# Patient Record
Sex: MALE | Race: ASIAN | NOT HISPANIC OR LATINO | URBAN - METROPOLITAN AREA
[De-identification: names, ages, dates, MRNs, and addresses within clinical notes are randomized per-mention and may not be internally consistent; named-entity substitution may affect disease eponyms.]

---

## 2015-01-06 RX ORDER — PANTOPRAZOLE SODIUM 20 MG/1
1 TABLET, DELAYED RELEASE ORAL
Qty: 0 | Refills: 0 | DISCHARGE
Start: 2015-01-06

## 2015-01-06 RX ORDER — ATORVASTATIN CALCIUM 80 MG/1
1 TABLET, FILM COATED ORAL
Qty: 0 | Refills: 0 | DISCHARGE
Start: 2015-01-06

## 2017-05-09 ENCOUNTER — INPATIENT (INPATIENT)
Facility: HOSPITAL | Age: 72
LOS: 1 days | Discharge: HOME | End: 2017-05-11
Attending: INTERNAL MEDICINE | Admitting: INTERNAL MEDICINE
Payer: MEDICARE

## 2017-05-09 VITALS
SYSTOLIC BLOOD PRESSURE: 146 MMHG | OXYGEN SATURATION: 100 % | HEART RATE: 79 BPM | DIASTOLIC BLOOD PRESSURE: 69 MMHG | RESPIRATION RATE: 16 BRPM | TEMPERATURE: 98 F

## 2017-05-09 DIAGNOSIS — Z29.9 ENCOUNTER FOR PROPHYLACTIC MEASURES, UNSPECIFIED: ICD-10-CM

## 2017-05-09 DIAGNOSIS — R42 DIZZINESS AND GIDDINESS: ICD-10-CM

## 2017-05-09 DIAGNOSIS — I10 ESSENTIAL (PRIMARY) HYPERTENSION: ICD-10-CM

## 2017-05-09 DIAGNOSIS — E78.5 HYPERLIPIDEMIA, UNSPECIFIED: ICD-10-CM

## 2017-05-09 DIAGNOSIS — E11.59 TYPE 2 DIABETES MELLITUS WITH OTHER CIRCULATORY COMPLICATIONS: ICD-10-CM

## 2017-05-09 DIAGNOSIS — I60.9 NONTRAUMATIC SUBARACHNOID HEMORRHAGE, UNSPECIFIED: ICD-10-CM

## 2017-05-09 DIAGNOSIS — N40.0 BENIGN PROSTATIC HYPERPLASIA WITHOUT LOWER URINARY TRACT SYMPTOMS: ICD-10-CM

## 2017-05-09 LAB
ALBUMIN SERPL ELPH-MCNC: 4.2 G/DL — SIGNIFICANT CHANGE UP (ref 3.3–5)
ALP SERPL-CCNC: 69 U/L — SIGNIFICANT CHANGE UP (ref 40–120)
ALT FLD-CCNC: 34 U/L — SIGNIFICANT CHANGE UP (ref 4–41)
APPEARANCE UR: CLEAR — SIGNIFICANT CHANGE UP
APTT BLD: 25.8 SEC — LOW (ref 27.5–37.4)
AST SERPL-CCNC: 34 U/L — SIGNIFICANT CHANGE UP (ref 4–40)
BASE EXCESS BLDV CALC-SCNC: 1.8 MMOL/L — SIGNIFICANT CHANGE UP
BASOPHILS # BLD AUTO: 0.02 K/UL — SIGNIFICANT CHANGE UP (ref 0–0.2)
BASOPHILS NFR BLD AUTO: 0.3 % — SIGNIFICANT CHANGE UP (ref 0–2)
BILIRUB SERPL-MCNC: 0.3 MG/DL — SIGNIFICANT CHANGE UP (ref 0.2–1.2)
BILIRUB UR-MCNC: NEGATIVE — SIGNIFICANT CHANGE UP
BLOOD GAS VENOUS - CREATININE: 0.92 MG/DL — SIGNIFICANT CHANGE UP (ref 0.5–1.3)
BLOOD UR QL VISUAL: NEGATIVE — SIGNIFICANT CHANGE UP
BUN SERPL-MCNC: 18 MG/DL — SIGNIFICANT CHANGE UP (ref 7–23)
CALCIUM SERPL-MCNC: 9.1 MG/DL — SIGNIFICANT CHANGE UP (ref 8.4–10.5)
CHLORIDE BLDV-SCNC: 102 MMOL/L — SIGNIFICANT CHANGE UP (ref 96–108)
CHLORIDE SERPL-SCNC: 99 MMOL/L — SIGNIFICANT CHANGE UP (ref 98–107)
CK MB BLD-MCNC: 9.8 NG/ML — HIGH (ref 1–6.6)
CK SERPL-CCNC: 504 U/L — HIGH (ref 30–200)
CO2 SERPL-SCNC: 22 MMOL/L — SIGNIFICANT CHANGE UP (ref 22–31)
COLOR SPEC: SIGNIFICANT CHANGE UP
CREAT SERPL-MCNC: 1.02 MG/DL — SIGNIFICANT CHANGE UP (ref 0.5–1.3)
EOSINOPHIL # BLD AUTO: 0.11 K/UL — SIGNIFICANT CHANGE UP (ref 0–0.5)
EOSINOPHIL NFR BLD AUTO: 1.6 % — SIGNIFICANT CHANGE UP (ref 0–6)
GAS PNL BLDV: 136 MMOL/L — SIGNIFICANT CHANGE UP (ref 136–146)
GLUCOSE BLDV-MCNC: 215 — HIGH (ref 70–99)
GLUCOSE SERPL-MCNC: 211 MG/DL — HIGH (ref 70–99)
GLUCOSE UR-MCNC: NEGATIVE — SIGNIFICANT CHANGE UP
HCO3 BLDV-SCNC: 24 MMOL/L — SIGNIFICANT CHANGE UP (ref 20–27)
HCT VFR BLD CALC: 39.1 % — SIGNIFICANT CHANGE UP (ref 39–50)
HCT VFR BLDV CALC: 40.6 % — SIGNIFICANT CHANGE UP (ref 39–51)
HGB BLD-MCNC: 12.9 G/DL — LOW (ref 13–17)
HGB BLDV-MCNC: 13.2 G/DL — SIGNIFICANT CHANGE UP (ref 13–17)
IMM GRANULOCYTES NFR BLD AUTO: 0.3 % — SIGNIFICANT CHANGE UP (ref 0–1.5)
INR BLD: 1.1 — SIGNIFICANT CHANGE UP (ref 0.88–1.17)
KETONES UR-MCNC: NEGATIVE — SIGNIFICANT CHANGE UP
LACTATE BLDV-MCNC: 2.8 MMOL/L — HIGH (ref 0.5–2)
LEUKOCYTE ESTERASE UR-ACNC: NEGATIVE — SIGNIFICANT CHANGE UP
LYMPHOCYTES # BLD AUTO: 2.2 K/UL — SIGNIFICANT CHANGE UP (ref 1–3.3)
LYMPHOCYTES # BLD AUTO: 31.4 % — SIGNIFICANT CHANGE UP (ref 13–44)
MCHC RBC-ENTMCNC: 32.3 PG — SIGNIFICANT CHANGE UP (ref 27–34)
MCHC RBC-ENTMCNC: 33 % — SIGNIFICANT CHANGE UP (ref 32–36)
MCV RBC AUTO: 97.8 FL — SIGNIFICANT CHANGE UP (ref 80–100)
MONOCYTES # BLD AUTO: 0.51 K/UL — SIGNIFICANT CHANGE UP (ref 0–0.9)
MONOCYTES NFR BLD AUTO: 7.3 % — SIGNIFICANT CHANGE UP (ref 2–14)
NEUTROPHILS # BLD AUTO: 4.14 K/UL — SIGNIFICANT CHANGE UP (ref 1.8–7.4)
NEUTROPHILS NFR BLD AUTO: 59.1 % — SIGNIFICANT CHANGE UP (ref 43–77)
NITRITE UR-MCNC: NEGATIVE — SIGNIFICANT CHANGE UP
PCO2 BLDV: 54 MMHG — HIGH (ref 41–51)
PH BLDV: 7.32 PH — SIGNIFICANT CHANGE UP (ref 7.32–7.43)
PH UR: 6.5 — SIGNIFICANT CHANGE UP (ref 4.6–8)
PLATELET # BLD AUTO: 177 K/UL — SIGNIFICANT CHANGE UP (ref 150–400)
PMV BLD: 11.4 FL — SIGNIFICANT CHANGE UP (ref 7–13)
PO2 BLDV: < 24 MMHG — LOW (ref 35–40)
POTASSIUM BLDV-SCNC: 4.9 MMOL/L — HIGH (ref 3.4–4.5)
POTASSIUM SERPL-MCNC: 5 MMOL/L — SIGNIFICANT CHANGE UP (ref 3.5–5.3)
POTASSIUM SERPL-SCNC: 5 MMOL/L — SIGNIFICANT CHANGE UP (ref 3.5–5.3)
PROT SERPL-MCNC: 7.5 G/DL — SIGNIFICANT CHANGE UP (ref 6–8.3)
PROT UR-MCNC: NEGATIVE — SIGNIFICANT CHANGE UP
PROTHROM AB SERPL-ACNC: 12.3 SEC — SIGNIFICANT CHANGE UP (ref 9.8–13.1)
RBC # BLD: 4 M/UL — LOW (ref 4.2–5.8)
RBC # FLD: 13.1 % — SIGNIFICANT CHANGE UP (ref 10.3–14.5)
RBC CASTS # UR COMP ASSIST: SIGNIFICANT CHANGE UP (ref 0–?)
SAO2 % BLDV: 32.9 % — LOW (ref 60–85)
SODIUM SERPL-SCNC: 136 MMOL/L — SIGNIFICANT CHANGE UP (ref 135–145)
SP GR SPEC: 1.03 — SIGNIFICANT CHANGE UP (ref 1–1.03)
TROPONIN T SERPL-MCNC: < 0.06 NG/ML — SIGNIFICANT CHANGE UP (ref 0–0.06)
UROBILINOGEN FLD QL: NORMAL E.U. — SIGNIFICANT CHANGE UP (ref 0.1–0.2)
WBC # BLD: 7 K/UL — SIGNIFICANT CHANGE UP (ref 3.8–10.5)
WBC # FLD AUTO: 7 K/UL — SIGNIFICANT CHANGE UP (ref 3.8–10.5)
WBC UR QL: SIGNIFICANT CHANGE UP (ref 0–?)

## 2017-05-09 PROCEDURE — 70498 CT ANGIOGRAPHY NECK: CPT | Mod: 26,52

## 2017-05-09 PROCEDURE — 70496 CT ANGIOGRAPHY HEAD: CPT | Mod: 26

## 2017-05-09 PROCEDURE — 99223 1ST HOSP IP/OBS HIGH 75: CPT

## 2017-05-09 PROCEDURE — 70551 MRI BRAIN STEM W/O DYE: CPT | Mod: 26

## 2017-05-09 RX ORDER — TAMSULOSIN HYDROCHLORIDE 0.4 MG/1
0.4 CAPSULE ORAL AT BEDTIME
Qty: 0 | Refills: 0 | Status: DISCONTINUED | OUTPATIENT
Start: 2017-05-09 | End: 2017-05-11

## 2017-05-09 RX ORDER — GLUCAGON INJECTION, SOLUTION 0.5 MG/.1ML
1 INJECTION, SOLUTION SUBCUTANEOUS ONCE
Qty: 0 | Refills: 0 | Status: DISCONTINUED | OUTPATIENT
Start: 2017-05-09 | End: 2017-05-11

## 2017-05-09 RX ORDER — ATORVASTATIN CALCIUM 80 MG/1
20 TABLET, FILM COATED ORAL AT BEDTIME
Qty: 0 | Refills: 0 | Status: DISCONTINUED | OUTPATIENT
Start: 2017-05-09 | End: 2017-05-11

## 2017-05-09 RX ORDER — DEXTROSE 50 % IN WATER 50 %
1 SYRINGE (ML) INTRAVENOUS ONCE
Qty: 0 | Refills: 0 | Status: DISCONTINUED | OUTPATIENT
Start: 2017-05-09 | End: 2017-05-11

## 2017-05-09 RX ORDER — ASPIRIN/CALCIUM CARB/MAGNESIUM 324 MG
81 TABLET ORAL ONCE
Qty: 0 | Refills: 0 | Status: COMPLETED | OUTPATIENT
Start: 2017-05-09 | End: 2017-05-09

## 2017-05-09 RX ORDER — LISINOPRIL 2.5 MG/1
2.5 TABLET ORAL DAILY
Qty: 0 | Refills: 0 | Status: DISCONTINUED | OUTPATIENT
Start: 2017-05-09 | End: 2017-05-11

## 2017-05-09 RX ORDER — PANTOPRAZOLE SODIUM 20 MG/1
40 TABLET, DELAYED RELEASE ORAL
Qty: 0 | Refills: 0 | Status: DISCONTINUED | OUTPATIENT
Start: 2017-05-09 | End: 2017-05-11

## 2017-05-09 RX ORDER — ASPIRIN/CALCIUM CARB/MAGNESIUM 324 MG
81 TABLET ORAL DAILY
Qty: 0 | Refills: 0 | Status: DISCONTINUED | OUTPATIENT
Start: 2017-05-09 | End: 2017-05-11

## 2017-05-09 RX ORDER — DEXTROSE 50 % IN WATER 50 %
25 SYRINGE (ML) INTRAVENOUS ONCE
Qty: 0 | Refills: 0 | Status: DISCONTINUED | OUTPATIENT
Start: 2017-05-09 | End: 2017-05-11

## 2017-05-09 RX ORDER — SODIUM CHLORIDE 9 MG/ML
1000 INJECTION, SOLUTION INTRAVENOUS
Qty: 0 | Refills: 0 | Status: DISCONTINUED | OUTPATIENT
Start: 2017-05-09 | End: 2017-05-11

## 2017-05-09 RX ORDER — METOCLOPRAMIDE HCL 10 MG
10 TABLET ORAL ONCE
Qty: 0 | Refills: 0 | Status: COMPLETED | OUTPATIENT
Start: 2017-05-09 | End: 2017-05-09

## 2017-05-09 RX ORDER — ENOXAPARIN SODIUM 100 MG/ML
40 INJECTION SUBCUTANEOUS EVERY 24 HOURS
Qty: 0 | Refills: 0 | Status: DISCONTINUED | OUTPATIENT
Start: 2017-05-09 | End: 2017-05-11

## 2017-05-09 RX ORDER — INSULIN LISPRO 100/ML
VIAL (ML) SUBCUTANEOUS AT BEDTIME
Qty: 0 | Refills: 0 | Status: DISCONTINUED | OUTPATIENT
Start: 2017-05-09 | End: 2017-05-11

## 2017-05-09 RX ORDER — INSULIN LISPRO 100/ML
VIAL (ML) SUBCUTANEOUS
Qty: 0 | Refills: 0 | Status: DISCONTINUED | OUTPATIENT
Start: 2017-05-09 | End: 2017-05-11

## 2017-05-09 RX ORDER — DEXTROSE 50 % IN WATER 50 %
12.5 SYRINGE (ML) INTRAVENOUS ONCE
Qty: 0 | Refills: 0 | Status: DISCONTINUED | OUTPATIENT
Start: 2017-05-09 | End: 2017-05-11

## 2017-05-09 RX ADMIN — LISINOPRIL 2.5 MILLIGRAM(S): 2.5 TABLET ORAL at 17:14

## 2017-05-09 RX ADMIN — Medication 10 MILLIGRAM(S): at 10:04

## 2017-05-09 RX ADMIN — Medication 81 MILLIGRAM(S): at 10:08

## 2017-05-09 RX ADMIN — TAMSULOSIN HYDROCHLORIDE 0.4 MILLIGRAM(S): 0.4 CAPSULE ORAL at 22:15

## 2017-05-09 RX ADMIN — ENOXAPARIN SODIUM 40 MILLIGRAM(S): 100 INJECTION SUBCUTANEOUS at 17:14

## 2017-05-09 RX ADMIN — ATORVASTATIN CALCIUM 20 MILLIGRAM(S): 80 TABLET, FILM COATED ORAL at 22:15

## 2017-05-09 NOTE — ED ADULT NURSE NOTE - CHIEF COMPLAINT QUOTE
when pt awoke this morning he felt dizzy and nauseous  vomited x 1   states that sometimes he gets pain in his chest but has had none today  ekg at triage finger stick 176 at triage

## 2017-05-09 NOTE — ED PROVIDER NOTE - PHYSICAL EXAMINATION
ATTENDING PHYSICAL EXAM DR. RADER ***GEN - NAD; well appearing; A+O x3 ***HEAD - NC/AT ***EYES/NOSE - PERRL, EOMI, mucous membranes moist, no discharge ***THROAT: Oral cavity and pharynx normal. No inflammation, swelling, exudate, or lesions.  ***NECK: Neck supple, non-tender without lymphadenopathy, no masses, no thyromegaly.   ***PULMONARY - CTA b/l, symmetric breath sounds. ***CARDIAC -s1s2, RRR, no M,G,R  ***ABDOMEN - +BS, ND, NT, soft, no guarding, no rebound, no masses   ***BACK - no CVA tenderness, Normal  spine ***EXTREMITIES - symmetric pulses, 2+ dp, capillary refill < 2 seconds, no clubbing, no cyanosis, no edema ***SKIN - no rash or bruising   ***NEUROLOGIC - alert, CN 2-12 intact, reflexes nl, sensation nl, coordination nl, finger to nose abnormal on b/l ext, romberg negative, motor 5/5 RUE/LUE/RLE/LLE/EHL/Plantar flexion, no pronator drift, wide base gait, no nystagmus

## 2017-05-09 NOTE — H&P ADULT. - HISTORY OF PRESENT ILLNESS
72M HTN, HLD, DM2, CAD, BPH, prior SAH who presents with dizziness since early this morning. Pt noticed this on waking and was unable to ambulate. He denies chest pain, SOB, palpitations, headache, or vision changes. However, he did have nausea with non-bilious vomiting. He has had several similar episodes in the past that did not culminate in vomiting. 72M HTN, HLD, DM2, CAD, BPH, prior SAH who presents with dizziness since early this morning. Pt noticed this on waking and was unable to ambulate. He denies chest pain, SOB, palpitations, headache, or vision changes. However, he did have nausea with non-bilious, non-bloody vomiting. He has had several similar episodes in the past that did not culminate in vomiting. He states that the dizziness persists even now while he is lying in bed.

## 2017-05-09 NOTE — ED ADULT NURSE NOTE - OBJECTIVE STATEMENT
Pt. received in stable condition and mild distress with c/o of dizziness and n/v x this AM. TONY Dan put IV 20G in left arm. Labs were drawn and sent. MD evaluation in progress. On CM. VSS noted. Will cont. to monitor.

## 2017-05-09 NOTE — ED PROVIDER NOTE - OBJECTIVE STATEMENT
72M c/o dizziness and vomiting since 6am.  Pt had just waken from bed and upon getting up felt dizzy and had difficulty walking.  Pt felt as if he was going to lose consciousness.  No chest pain today.  Pt c/o dizziness episodically on a regular basis lasting a few minutes, but usually not associated with vomiting and not this severe.  No abdominal pain or diarrhea.  No headache, no blurred vision.  No paresthesia or anesthesia.  No weakness in arms.  No hearing loss, no tinnitus. 72M c/o dizziness and vomiting since 6am.  Pt had just waken from bed at 6am and upon getting up felt dizzy and had difficulty walking.  Pt felt as if he was going to lose consciousness.  No chest pain today.  Pt c/o dizziness episodically on a regular basis lasting a few minutes, but usually not associated with vomiting and not this severe.  No abdominal pain or diarrhea.  No headache, no blurred vision.  No paresthesia or anesthesia.  No weakness in arms.  No hearing loss, no tinnitus.  Last known normal last night 330am.

## 2017-05-09 NOTE — ED ADULT TRIAGE NOTE - CHIEF COMPLAINT QUOTE
when pt awoke this morning he felt dizzy and nauseous  vomited x 1   states that sometimes he gets pain in his chest but has had none today  ekg at triage when pt awoke this morning he felt dizzy and nauseous  vomited x 1   states that sometimes he gets pain in his chest but has had none today  ekg at triage finger stick 176 at triage

## 2017-05-09 NOTE — H&P ADULT. - FAMILY HISTORY
Father  Still living? Unknown  Cerebrovascular accident (CVA), Age at diagnosis: Age Unknown     Mother  Still living? Unknown  Family history of liver disease, Age at diagnosis: Age Unknown

## 2017-05-09 NOTE — PATIENT PROFILE ADULT. - PMH
BPH (benign prostatic hypertrophy)    CAD (Coronary Artery Disease)    Diabetes    Dyslipidemia    Gastritis    HTN (Hypertension), Benign    SAH (subarachnoid hemorrhage)

## 2017-05-09 NOTE — ED PROVIDER NOTE - PMH
Arthritis    BPH (benign prostatic hypertrophy)    CAD (Coronary Artery Disease)    Diabetes    Dyslipidemia    Gastritis    Hemorrhoids, unspecified hemorrhoid type    HTN (Hypertension), Benign    SAH (subarachnoid hemorrhage)

## 2017-05-10 LAB
BASOPHILS # BLD AUTO: 0.01 K/UL — SIGNIFICANT CHANGE UP (ref 0–0.2)
BASOPHILS NFR BLD AUTO: 0.2 % — SIGNIFICANT CHANGE UP (ref 0–2)
BUN SERPL-MCNC: 20 MG/DL — SIGNIFICANT CHANGE UP (ref 7–23)
CALCIUM SERPL-MCNC: 9.1 MG/DL — SIGNIFICANT CHANGE UP (ref 8.4–10.5)
CHLORIDE SERPL-SCNC: 100 MMOL/L — SIGNIFICANT CHANGE UP (ref 98–107)
CO2 SERPL-SCNC: 25 MMOL/L — SIGNIFICANT CHANGE UP (ref 22–31)
CREAT SERPL-MCNC: 1.05 MG/DL — SIGNIFICANT CHANGE UP (ref 0.5–1.3)
EOSINOPHIL # BLD AUTO: 0.17 K/UL — SIGNIFICANT CHANGE UP (ref 0–0.5)
EOSINOPHIL NFR BLD AUTO: 2.6 % — SIGNIFICANT CHANGE UP (ref 0–6)
GLUCOSE SERPL-MCNC: 144 MG/DL — HIGH (ref 70–99)
HBA1C BLD-MCNC: 8.3 % — HIGH (ref 4–5.6)
HCT VFR BLD CALC: 37.2 % — LOW (ref 39–50)
HGB BLD-MCNC: 12.5 G/DL — LOW (ref 13–17)
IMM GRANULOCYTES NFR BLD AUTO: 0.2 % — SIGNIFICANT CHANGE UP (ref 0–1.5)
LYMPHOCYTES # BLD AUTO: 3.07 K/UL — SIGNIFICANT CHANGE UP (ref 1–3.3)
LYMPHOCYTES # BLD AUTO: 46.7 % — HIGH (ref 13–44)
MCHC RBC-ENTMCNC: 32.6 PG — SIGNIFICANT CHANGE UP (ref 27–34)
MCHC RBC-ENTMCNC: 33.6 % — SIGNIFICANT CHANGE UP (ref 32–36)
MCV RBC AUTO: 96.9 FL — SIGNIFICANT CHANGE UP (ref 80–100)
MONOCYTES # BLD AUTO: 0.51 K/UL — SIGNIFICANT CHANGE UP (ref 0–0.9)
MONOCYTES NFR BLD AUTO: 7.8 % — SIGNIFICANT CHANGE UP (ref 2–14)
NEUTROPHILS # BLD AUTO: 2.8 K/UL — SIGNIFICANT CHANGE UP (ref 1.8–7.4)
NEUTROPHILS NFR BLD AUTO: 42.5 % — LOW (ref 43–77)
PLATELET # BLD AUTO: 169 K/UL — SIGNIFICANT CHANGE UP (ref 150–400)
PMV BLD: 11.7 FL — SIGNIFICANT CHANGE UP (ref 7–13)
POTASSIUM SERPL-MCNC: 4.5 MMOL/L — SIGNIFICANT CHANGE UP (ref 3.5–5.3)
POTASSIUM SERPL-SCNC: 4.5 MMOL/L — SIGNIFICANT CHANGE UP (ref 3.5–5.3)
RBC # BLD: 3.84 M/UL — LOW (ref 4.2–5.8)
RBC # FLD: 13.2 % — SIGNIFICANT CHANGE UP (ref 10.3–14.5)
SODIUM SERPL-SCNC: 138 MMOL/L — SIGNIFICANT CHANGE UP (ref 135–145)
WBC # BLD: 6.57 K/UL — SIGNIFICANT CHANGE UP (ref 3.8–10.5)
WBC # FLD AUTO: 6.57 K/UL — SIGNIFICANT CHANGE UP (ref 3.8–10.5)

## 2017-05-10 PROCEDURE — 99221 1ST HOSP IP/OBS SF/LOW 40: CPT | Mod: GC

## 2017-05-10 PROCEDURE — 93306 TTE W/DOPPLER COMPLETE: CPT | Mod: 26

## 2017-05-10 RX ADMIN — PANTOPRAZOLE SODIUM 40 MILLIGRAM(S): 20 TABLET, DELAYED RELEASE ORAL at 05:17

## 2017-05-10 RX ADMIN — TAMSULOSIN HYDROCHLORIDE 0.4 MILLIGRAM(S): 0.4 CAPSULE ORAL at 21:57

## 2017-05-10 RX ADMIN — ATORVASTATIN CALCIUM 20 MILLIGRAM(S): 80 TABLET, FILM COATED ORAL at 21:57

## 2017-05-10 RX ADMIN — Medication 2: at 16:53

## 2017-05-10 RX ADMIN — ENOXAPARIN SODIUM 40 MILLIGRAM(S): 100 INJECTION SUBCUTANEOUS at 17:00

## 2017-05-10 RX ADMIN — Medication 2: at 08:34

## 2017-05-10 RX ADMIN — Medication 81 MILLIGRAM(S): at 12:40

## 2017-05-10 RX ADMIN — Medication 4: at 12:37

## 2017-05-10 RX ADMIN — LISINOPRIL 2.5 MILLIGRAM(S): 2.5 TABLET ORAL at 05:17

## 2017-05-11 ENCOUNTER — TRANSCRIPTION ENCOUNTER (OUTPATIENT)
Age: 72
End: 2017-05-11

## 2017-05-11 VITALS
OXYGEN SATURATION: 98 % | DIASTOLIC BLOOD PRESSURE: 64 MMHG | RESPIRATION RATE: 18 BRPM | SYSTOLIC BLOOD PRESSURE: 119 MMHG | HEART RATE: 79 BPM

## 2017-05-11 RX ADMIN — ENOXAPARIN SODIUM 40 MILLIGRAM(S): 100 INJECTION SUBCUTANEOUS at 16:54

## 2017-05-11 RX ADMIN — Medication 2: at 12:38

## 2017-05-11 RX ADMIN — LISINOPRIL 2.5 MILLIGRAM(S): 2.5 TABLET ORAL at 05:35

## 2017-05-11 RX ADMIN — Medication 2: at 16:55

## 2017-05-11 RX ADMIN — Medication 2: at 08:52

## 2017-05-11 RX ADMIN — Medication 81 MILLIGRAM(S): at 12:38

## 2017-05-11 RX ADMIN — PANTOPRAZOLE SODIUM 40 MILLIGRAM(S): 20 TABLET, DELAYED RELEASE ORAL at 05:35

## 2017-05-11 NOTE — DISCHARGE NOTE ADULT - CARE PROVIDER_API CALL
Olu Anthony), Cardiovascular Disease; Internal Medicine; Nuclear Cardiology  [address] [place] [address]  Phone: [phone]  Fax: [phone]

## 2017-05-11 NOTE — DISCHARGE NOTE ADULT - PLAN OF CARE
improved Neurology consulted and followed.   MRI head- -MRI head- Slight progression to atrophy as well as microvascular ischemic change. There is evidence of hemosiderin deposition along the left temporal parietal sulci and in the region of the left central sulcus as well as in the right frontal region. These areas of susceptibility are more conspicuous compared with 4/20/2014 suggesting the possibility   patient may have rehemorrhaged in the interval between the 2 studies. There does not appear to be evidence of acute hemorrhage on the current evaluation as indicated by no change in the FLAIR signal and the CT not demonstrating evidence of hemorrhage at this time  Follow up with neurology clinic outpatient [phone], call for appointment resolved Physical therapy consulted- no skilled needs MRI results as above. Follow up with neurologist outpatient. A1c- 8.3. continue to monitor fingersticks, continue with home diabetes medications. follow diabetic diet. continue with lisinopril continue with flomax continue with aspirin and lipitor

## 2017-05-11 NOTE — DISCHARGE NOTE ADULT - HOSPITAL COURSE
72M HTN, HLD, DM2, CAD, BPH, prior SAH who presents with dizziness since early this morning. Pt noticed this on waking and was unable to ambulate. He denies chest pain, SOB, palpitations, headache, or vision changes. However, he did have nausea with non-bilious, non-bloody vomiting. He has had several similar episodes in the past that did not culminate in vomiting. He states that the dizziness persists even now while he is lying in bed.    Hospital Course:    Dizziness.    -Suspect pre-existing cerebrovascular disease without acute stroke   -Appreciate Neurology consult   -MRI head- Slight progression to atrophy as well as microvascular ischemic change. There is evidence of hemosiderin deposition along the left temporal parietal sulci and in the region of the left central sulcus as well as in the right frontal region. These areas of susceptibility are more conspicuous compared with 4/20/2014 suggesting the possibility   patient may have rehemorrhaged in the interval between the 2 studies. There does not appear to be evidence of acute hemorrhage on the current evaluation as indicated by no change in the FLAIR signal and the CT not demonstrating evidence of hemorrhage at this time  -Continue CV regimen  -orthostatics negative  -ECHO- EF 64% normal LV    HTN   -Continue lisinopril    Type 2 diabetes mellitus with other circulatory complication  -With known CAD s/p stent 2010   -Cover with lispro correctional scale in-house  -A1c- 8.3    Hyperlipidemia  -Continue aspirin and atorvastatin    SAH (subarachnoid hemorrhage).    -Per pt 2014, no residual symptoms.   -No evidence of bleeding on CT today   -Monitor neurologic exam    Benign prostatic hyperplasia without lower urinary tract symptoms   -No current LUTS  -Continue tamsulosin    Need for prophylactic measure.    -IMPROVE score = 2 (age + immobility)  -enoxaparin 40mg subq daily    D/w Dr. Anthony, pt medically stable for discharge. Follow up with PCP in 1 week. Follow up with neurology clinic.

## 2017-05-11 NOTE — DISCHARGE NOTE ADULT - PATIENT PORTAL LINK FT
“You can access the Follow[facility] Patient Portal, offered by [facility], by registering with the following website: [email or web address]”

## 2017-05-11 NOTE — DISCHARGE NOTE ADULT - SECONDARY DIAGNOSIS.
Ataxia SAH (subarachnoid hemorrhage) Type 2 diabetes mellitus with other circulatory complication, without long-term current use of insulin HTN (Hypertension), Benign Benign prostatic hyperplasia without lower urinary tract symptoms, unspecified morphology CAD (coronary artery disease)

## 2017-05-11 NOTE — DISCHARGE NOTE ADULT - MEDICATION SUMMARY - MEDICATIONS TO TAKE
I will START or STAY ON the medications listed below when I get home from the hospital:    aspirin 81 mg oral tablet, chewable  -- 1 tab(s) by mouth once a day  -- Indication: For Blood thinner    lisinopril 2.5 mg oral tablet  -- 1 tab(s) by mouth once a day  -- Indication: For HTN (Hypertension), Benign    tamsulosin 0.4 mg oral capsule  -- 1 cap(s) by mouth once a day  -- Indication: For Benign prostatic hyperplasia without lower urinary tract symptoms, unspecified morphology    glipiZIDE 5 mg oral tablet  -- 1 tab(s) by mouth once a day  -- Indication: For Diabetes    metFORMIN extended release 500 mg oral tablet, extended release  -- 1 tab(s) by mouth once a day  -- Indication: For Diabetes    atorvastatin 20 mg oral tablet  -- 1 tab(s) by mouth once a day (at bedtime)  -- Indication: For Hyperlipidemia, unspecified hyperlipidemia type    Zetia 10 mg oral tablet  -- 1 tab(s) by mouth once a day  -- Indication: For Hyperlipidemia, unspecified hyperlipidemia type    pantoprazole 40 mg oral delayed release tablet  -- 1 tab(s) by mouth once a day (before a meal)  -- Indication: For Stomach protection

## 2017-05-11 NOTE — DISCHARGE NOTE ADULT - CARE PLAN
Principal Discharge DX:	Dizziness  Goal:	improved  Instructions for follow-up, activity and diet:	Neurology consulted and followed.   MRI head- -MRI head- Slight progression to atrophy as well as microvascular ischemic change. There is evidence of hemosiderin deposition along the left temporal parietal sulci and in the region of the left central sulcus as well as in the right frontal region. These areas of susceptibility are more conspicuous compared with 4/20/2014 suggesting the possibility   patient may have rehemorrhaged in the interval between the 2 studies. There does not appear to be evidence of acute hemorrhage on the current evaluation as indicated by no change in the FLAIR signal and the CT not demonstrating evidence of hemorrhage at this time  Follow up with neurology clinic outpatient [phone], call for appointment  Secondary Diagnosis:	Ataxia  Goal:	resolved  Instructions for follow-up, activity and diet:	Physical therapy consulted- no skilled needs  Secondary Diagnosis:	SAH (subarachnoid hemorrhage)  Instructions for follow-up, activity and diet:	MRI results as above. Follow up with neurologist outpatient.  Secondary Diagnosis:	Type 2 diabetes mellitus with other circulatory complication, without long-term current use of insulin  Instructions for follow-up, activity and diet:	A1c- 8.3. continue to monitor fingersticks, continue with home diabetes medications. follow diabetic diet.  Secondary Diagnosis:	HTN (Hypertension), Benign  Instructions for follow-up, activity and diet:	continue with lisinopril  Secondary Diagnosis:	Benign prostatic hyperplasia without lower urinary tract symptoms, unspecified morphology  Instructions for follow-up, activity and diet:	continue with flomax  Secondary Diagnosis:	CAD (coronary artery disease)  Instructions for follow-up, activity and diet:	continue with aspirin and lipitor Principal Discharge DX:	Dizziness  Goal:	improved  Instructions for follow-up, activity and diet:	Neurology consulted and followed.   MRI head- -MRI head- Slight progression to atrophy as well as microvascular ischemic change. There is evidence of hemosiderin deposition along the left temporal parietal sulci and in the region of the left central sulcus as well as in the right frontal region. These areas of susceptibility are more conspicuous compared with 4/20/2014 suggesting the possibility   patient may have rehemorrhaged in the interval between the 2 studies. There does not appear to be evidence of acute hemorrhage on the current evaluation as indicated by no change in the FLAIR signal and the CT not demonstrating evidence of hemorrhage at this time  Follow up with neurology clinic outpatient [phone], call for appointment  Secondary Diagnosis:	Ataxia  Goal:	resolved  Instructions for follow-up, activity and diet:	Physical therapy consulted- no skilled needs  Secondary Diagnosis:	SAH (subarachnoid hemorrhage)  Instructions for follow-up, activity and diet:	MRI results as above. Follow up with neurologist outpatient.  Secondary Diagnosis:	Type 2 diabetes mellitus with other circulatory complication, without long-term current use of insulin  Instructions for follow-up, activity and diet:	A1c- 8.3. continue to monitor fingersticks, continue with home diabetes medications. follow diabetic diet.  Secondary Diagnosis:	HTN (Hypertension), Benign  Instructions for follow-up, activity and diet:	continue with lisinopril  Secondary Diagnosis:	Benign prostatic hyperplasia without lower urinary tract symptoms, unspecified morphology  Instructions for follow-up, activity and diet:	continue with flomax  Secondary Diagnosis:	CAD (coronary artery disease)  Instructions for follow-up, activity and diet:	continue with aspirin and lipitor

## 2017-07-18 ENCOUNTER — OUTPATIENT (OUTPATIENT)
Dept: OUTPATIENT SERVICES | Facility: HOSPITAL | Age: 72
LOS: 1 days | End: 2017-07-18

## 2017-07-18 ENCOUNTER — APPOINTMENT (OUTPATIENT)
Dept: NEUROLOGY | Facility: HOSPITAL | Age: 72
End: 2017-07-18

## 2017-07-18 VITALS
HEART RATE: 74 BPM | SYSTOLIC BLOOD PRESSURE: 133 MMHG | BODY MASS INDEX: 22.71 KG/M2 | HEIGHT: 64 IN | DIASTOLIC BLOOD PRESSURE: 72 MMHG | WEIGHT: 133 LBS

## 2017-07-18 DIAGNOSIS — R42 DIZZINESS AND GIDDINESS: ICD-10-CM

## 2017-07-19 NOTE — DISCHARGE NOTE ADULT - CLICK TO LAUNCH ORM
Body Location Override (Optional - Billing Will Still Be Based On Selected Body Map Location If Applicable): left distal forearm Detail Level: Detailed Add 37634 Cpt? (Important Note: In 2017 The Use Of 87083 Is Being Tracked By Cms To Determine Future Global Period Reimbursement For Global Periods): yes .

## 2018-01-30 ENCOUNTER — APPOINTMENT (OUTPATIENT)
Dept: NEUROLOGY | Facility: HOSPITAL | Age: 73
End: 2018-01-30

## 2018-07-26 ENCOUNTER — INPATIENT (INPATIENT)
Facility: HOSPITAL | Age: 73
LOS: 0 days | Discharge: NURSING, REHAB OR LONG-TERM CARE | End: 2018-07-27
Attending: HOSPITALIST | Admitting: HOSPITALIST
Payer: MEDICARE

## 2018-07-26 VITALS
TEMPERATURE: 98 F | OXYGEN SATURATION: 100 % | HEART RATE: 115 BPM | DIASTOLIC BLOOD PRESSURE: 74 MMHG | SYSTOLIC BLOOD PRESSURE: 112 MMHG | RESPIRATION RATE: 18 BRPM

## 2018-07-26 DIAGNOSIS — R53.81 OTHER MALAISE: ICD-10-CM

## 2018-07-26 DIAGNOSIS — K92.2 GASTROINTESTINAL HEMORRHAGE, UNSPECIFIED: ICD-10-CM

## 2018-07-26 DIAGNOSIS — Z29.9 ENCOUNTER FOR PROPHYLACTIC MEASURES, UNSPECIFIED: ICD-10-CM

## 2018-07-26 DIAGNOSIS — E87.2 ACIDOSIS: ICD-10-CM

## 2018-07-26 DIAGNOSIS — I10 ESSENTIAL (PRIMARY) HYPERTENSION: ICD-10-CM

## 2018-07-26 DIAGNOSIS — I25.10 ATHEROSCLEROTIC HEART DISEASE OF NATIVE CORONARY ARTERY WITHOUT ANGINA PECTORIS: ICD-10-CM

## 2018-07-26 DIAGNOSIS — I60.9 NONTRAUMATIC SUBARACHNOID HEMORRHAGE, UNSPECIFIED: ICD-10-CM

## 2018-07-26 DIAGNOSIS — E78.5 HYPERLIPIDEMIA, UNSPECIFIED: ICD-10-CM

## 2018-07-26 DIAGNOSIS — N40.0 BENIGN PROSTATIC HYPERPLASIA WITHOUT LOWER URINARY TRACT SYMPTOMS: ICD-10-CM

## 2018-07-26 DIAGNOSIS — E11.9 TYPE 2 DIABETES MELLITUS WITHOUT COMPLICATIONS: ICD-10-CM

## 2018-07-26 DIAGNOSIS — K92.0 HEMATEMESIS: ICD-10-CM

## 2018-07-26 DIAGNOSIS — R40.0 SOMNOLENCE: ICD-10-CM

## 2018-07-26 LAB
ALBUMIN SERPL ELPH-MCNC: 3.3 G/DL — SIGNIFICANT CHANGE UP (ref 3.3–5)
ALP SERPL-CCNC: 93 U/L — SIGNIFICANT CHANGE UP (ref 40–120)
ALT FLD-CCNC: 19 U/L — SIGNIFICANT CHANGE UP (ref 4–41)
APPEARANCE UR: SIGNIFICANT CHANGE UP
APTT BLD: 25.2 SEC — LOW (ref 27.5–37.4)
AST SERPL-CCNC: 32 U/L — SIGNIFICANT CHANGE UP (ref 4–40)
BASE EXCESS BLDV CALC-SCNC: 5.4 MMOL/L — SIGNIFICANT CHANGE UP
BASOPHILS # BLD AUTO: 0.03 K/UL — SIGNIFICANT CHANGE UP (ref 0–0.2)
BASOPHILS NFR BLD AUTO: 0.4 % — SIGNIFICANT CHANGE UP (ref 0–2)
BILIRUB SERPL-MCNC: 0.5 MG/DL — SIGNIFICANT CHANGE UP (ref 0.2–1.2)
BILIRUB UR-MCNC: NEGATIVE — SIGNIFICANT CHANGE UP
BLD GP AB SCN SERPL QL: NEGATIVE — SIGNIFICANT CHANGE UP
BLOOD GAS VENOUS - CREATININE: 0.5 MG/DL — SIGNIFICANT CHANGE UP (ref 0.5–1.3)
BLOOD UR QL VISUAL: NEGATIVE — SIGNIFICANT CHANGE UP
BUN SERPL-MCNC: 22 MG/DL — SIGNIFICANT CHANGE UP (ref 7–23)
CALCIUM SERPL-MCNC: 9.1 MG/DL — SIGNIFICANT CHANGE UP (ref 8.4–10.5)
CHLORIDE BLDV-SCNC: 98 MMOL/L — SIGNIFICANT CHANGE UP (ref 96–108)
CHLORIDE SERPL-SCNC: 95 MMOL/L — LOW (ref 98–107)
CO2 SERPL-SCNC: 26 MMOL/L — SIGNIFICANT CHANGE UP (ref 22–31)
COLOR SPEC: YELLOW — SIGNIFICANT CHANGE UP
CREAT SERPL-MCNC: 0.5 MG/DL — SIGNIFICANT CHANGE UP (ref 0.5–1.3)
EOSINOPHIL # BLD AUTO: 0.04 K/UL — SIGNIFICANT CHANGE UP (ref 0–0.5)
EOSINOPHIL NFR BLD AUTO: 0.5 % — SIGNIFICANT CHANGE UP (ref 0–6)
GAS PNL BLDV: 134 MMOL/L — LOW (ref 136–146)
GLUCOSE BLDC GLUCOMTR-MCNC: 111 MG/DL — HIGH (ref 70–99)
GLUCOSE BLDC GLUCOMTR-MCNC: 111 MG/DL — HIGH (ref 70–99)
GLUCOSE BLDV-MCNC: 144 — HIGH (ref 70–99)
GLUCOSE SERPL-MCNC: 142 MG/DL — HIGH (ref 70–99)
GLUCOSE UR-MCNC: NEGATIVE — SIGNIFICANT CHANGE UP
HCO3 BLDV-SCNC: 28 MMOL/L — HIGH (ref 20–27)
HCT VFR BLD CALC: 41.6 % — SIGNIFICANT CHANGE UP (ref 39–50)
HCT VFR BLDV CALC: 43.9 % — SIGNIFICANT CHANGE UP (ref 39–51)
HGB BLD-MCNC: 13.6 G/DL — SIGNIFICANT CHANGE UP (ref 13–17)
HGB BLDV-MCNC: 14.3 G/DL — SIGNIFICANT CHANGE UP (ref 13–17)
HYALINE CASTS # UR AUTO: SIGNIFICANT CHANGE UP (ref 0–?)
IMM GRANULOCYTES # BLD AUTO: 0.02 # — SIGNIFICANT CHANGE UP
IMM GRANULOCYTES NFR BLD AUTO: 0.3 % — SIGNIFICANT CHANGE UP (ref 0–1.5)
INR BLD: 1.06 — SIGNIFICANT CHANGE UP (ref 0.88–1.17)
KETONES UR-MCNC: NEGATIVE — SIGNIFICANT CHANGE UP
LACTATE BLDV-MCNC: 3.3 MMOL/L — HIGH (ref 0.5–2)
LEUKOCYTE ESTERASE UR-ACNC: NEGATIVE — SIGNIFICANT CHANGE UP
LYMPHOCYTES # BLD AUTO: 1.46 K/UL — SIGNIFICANT CHANGE UP (ref 1–3.3)
LYMPHOCYTES # BLD AUTO: 19.6 % — SIGNIFICANT CHANGE UP (ref 13–44)
MCHC RBC-ENTMCNC: 30.2 PG — SIGNIFICANT CHANGE UP (ref 27–34)
MCHC RBC-ENTMCNC: 32.7 % — SIGNIFICANT CHANGE UP (ref 32–36)
MCV RBC AUTO: 92.2 FL — SIGNIFICANT CHANGE UP (ref 80–100)
MONOCYTES # BLD AUTO: 0.69 K/UL — SIGNIFICANT CHANGE UP (ref 0–0.9)
MONOCYTES NFR BLD AUTO: 9.2 % — SIGNIFICANT CHANGE UP (ref 2–14)
MUCOUS THREADS # UR AUTO: SIGNIFICANT CHANGE UP
NEUTROPHILS # BLD AUTO: 5.22 K/UL — SIGNIFICANT CHANGE UP (ref 1.8–7.4)
NEUTROPHILS NFR BLD AUTO: 70 % — SIGNIFICANT CHANGE UP (ref 43–77)
NITRITE UR-MCNC: NEGATIVE — SIGNIFICANT CHANGE UP
NON-SQ EPI CELLS # UR AUTO: <1 — SIGNIFICANT CHANGE UP
NRBC # FLD: 0 — SIGNIFICANT CHANGE UP
OB PNL STL: POSITIVE — SIGNIFICANT CHANGE UP
PCO2 BLDV: 51 MMHG — SIGNIFICANT CHANGE UP (ref 41–51)
PH BLDV: 7.39 PH — SIGNIFICANT CHANGE UP (ref 7.32–7.43)
PH UR: 8 — SIGNIFICANT CHANGE UP (ref 4.6–8)
PLATELET # BLD AUTO: 275 K/UL — SIGNIFICANT CHANGE UP (ref 150–400)
PMV BLD: 10.8 FL — SIGNIFICANT CHANGE UP (ref 7–13)
PO2 BLDV: 38 MMHG — SIGNIFICANT CHANGE UP (ref 35–40)
POTASSIUM BLDV-SCNC: 4.5 MMOL/L — SIGNIFICANT CHANGE UP (ref 3.4–4.5)
POTASSIUM SERPL-MCNC: 4.2 MMOL/L — SIGNIFICANT CHANGE UP (ref 3.5–5.3)
POTASSIUM SERPL-SCNC: 4.2 MMOL/L — SIGNIFICANT CHANGE UP (ref 3.5–5.3)
PROT SERPL-MCNC: 7.3 G/DL — SIGNIFICANT CHANGE UP (ref 6–8.3)
PROT UR-MCNC: 10 MG/DL — SIGNIFICANT CHANGE UP
PROTHROM AB SERPL-ACNC: 12.2 SEC — SIGNIFICANT CHANGE UP (ref 9.8–13.1)
RBC # BLD: 4.51 M/UL — SIGNIFICANT CHANGE UP (ref 4.2–5.8)
RBC # FLD: 15.2 % — HIGH (ref 10.3–14.5)
RBC CASTS # UR COMP ASSIST: HIGH (ref 0–?)
RH IG SCN BLD-IMP: POSITIVE — SIGNIFICANT CHANGE UP
SAO2 % BLDV: 66.3 % — SIGNIFICANT CHANGE UP (ref 60–85)
SODIUM SERPL-SCNC: 137 MMOL/L — SIGNIFICANT CHANGE UP (ref 135–145)
SP GR SPEC: 1.02 — SIGNIFICANT CHANGE UP (ref 1–1.04)
UROBILINOGEN FLD QL: NORMAL MG/DL — SIGNIFICANT CHANGE UP
WBC # BLD: 7.46 K/UL — SIGNIFICANT CHANGE UP (ref 3.8–10.5)
WBC # FLD AUTO: 7.46 K/UL — SIGNIFICANT CHANGE UP (ref 3.8–10.5)
WBC UR QL: SIGNIFICANT CHANGE UP (ref 0–?)

## 2018-07-26 PROCEDURE — 99223 1ST HOSP IP/OBS HIGH 75: CPT | Mod: GC

## 2018-07-26 PROCEDURE — 71045 X-RAY EXAM CHEST 1 VIEW: CPT | Mod: 26

## 2018-07-26 PROCEDURE — 70450 CT HEAD/BRAIN W/O DYE: CPT | Mod: 26

## 2018-07-26 RX ORDER — GLUCAGON INJECTION, SOLUTION 0.5 MG/.1ML
1 INJECTION, SOLUTION SUBCUTANEOUS ONCE
Qty: 0 | Refills: 0 | Status: DISCONTINUED | OUTPATIENT
Start: 2018-07-26 | End: 2018-07-27

## 2018-07-26 RX ORDER — DEXTROSE 50 % IN WATER 50 %
12.5 SYRINGE (ML) INTRAVENOUS ONCE
Qty: 0 | Refills: 0 | Status: DISCONTINUED | OUTPATIENT
Start: 2018-07-26 | End: 2018-07-27

## 2018-07-26 RX ORDER — TAMSULOSIN HYDROCHLORIDE 0.4 MG/1
0.4 CAPSULE ORAL AT BEDTIME
Qty: 0 | Refills: 0 | Status: DISCONTINUED | OUTPATIENT
Start: 2018-07-26 | End: 2018-07-26

## 2018-07-26 RX ORDER — SODIUM CHLORIDE 9 MG/ML
1000 INJECTION, SOLUTION INTRAVENOUS
Qty: 0 | Refills: 0 | Status: DISCONTINUED | OUTPATIENT
Start: 2018-07-26 | End: 2018-07-27

## 2018-07-26 RX ORDER — PANTOPRAZOLE SODIUM 20 MG/1
8 TABLET, DELAYED RELEASE ORAL
Qty: 80 | Refills: 0 | Status: DISCONTINUED | OUTPATIENT
Start: 2018-07-26 | End: 2018-07-26

## 2018-07-26 RX ORDER — ONDANSETRON 8 MG/1
4 TABLET, FILM COATED ORAL EVERY 6 HOURS
Qty: 0 | Refills: 0 | Status: DISCONTINUED | OUTPATIENT
Start: 2018-07-26 | End: 2018-07-27

## 2018-07-26 RX ORDER — PANTOPRAZOLE SODIUM 20 MG/1
80 TABLET, DELAYED RELEASE ORAL ONCE
Qty: 0 | Refills: 0 | Status: COMPLETED | OUTPATIENT
Start: 2018-07-26 | End: 2018-07-26

## 2018-07-26 RX ORDER — DEXTROSE 50 % IN WATER 50 %
15 SYRINGE (ML) INTRAVENOUS ONCE
Qty: 0 | Refills: 0 | Status: DISCONTINUED | OUTPATIENT
Start: 2018-07-26 | End: 2018-07-27

## 2018-07-26 RX ORDER — PANTOPRAZOLE SODIUM 20 MG/1
40 TABLET, DELAYED RELEASE ORAL EVERY 12 HOURS
Qty: 0 | Refills: 0 | Status: DISCONTINUED | OUTPATIENT
Start: 2018-07-27 | End: 2018-07-27

## 2018-07-26 RX ORDER — INSULIN LISPRO 100/ML
VIAL (ML) SUBCUTANEOUS AT BEDTIME
Qty: 0 | Refills: 0 | Status: DISCONTINUED | OUTPATIENT
Start: 2018-07-26 | End: 2018-07-27

## 2018-07-26 RX ORDER — INSULIN LISPRO 100/ML
VIAL (ML) SUBCUTANEOUS
Qty: 0 | Refills: 0 | Status: DISCONTINUED | OUTPATIENT
Start: 2018-07-26 | End: 2018-07-27

## 2018-07-26 RX ORDER — ATORVASTATIN CALCIUM 80 MG/1
20 TABLET, FILM COATED ORAL AT BEDTIME
Qty: 0 | Refills: 0 | Status: DISCONTINUED | OUTPATIENT
Start: 2018-07-26 | End: 2018-07-26

## 2018-07-26 RX ORDER — DEXTROSE 50 % IN WATER 50 %
25 SYRINGE (ML) INTRAVENOUS ONCE
Qty: 0 | Refills: 0 | Status: DISCONTINUED | OUTPATIENT
Start: 2018-07-26 | End: 2018-07-27

## 2018-07-26 RX ORDER — SODIUM CHLORIDE 9 MG/ML
1000 INJECTION INTRAMUSCULAR; INTRAVENOUS; SUBCUTANEOUS
Qty: 0 | Refills: 0 | Status: DISCONTINUED | OUTPATIENT
Start: 2018-07-26 | End: 2018-07-27

## 2018-07-26 RX ORDER — LISINOPRIL 2.5 MG/1
2.5 TABLET ORAL DAILY
Qty: 0 | Refills: 0 | Status: DISCONTINUED | OUTPATIENT
Start: 2018-07-26 | End: 2018-07-26

## 2018-07-26 RX ADMIN — SODIUM CHLORIDE 100 MILLILITER(S): 9 INJECTION INTRAMUSCULAR; INTRAVENOUS; SUBCUTANEOUS at 23:11

## 2018-07-26 RX ADMIN — PANTOPRAZOLE SODIUM 80 MILLIGRAM(S): 20 TABLET, DELAYED RELEASE ORAL at 14:34

## 2018-07-26 RX ADMIN — PANTOPRAZOLE SODIUM 10 MG/HR: 20 TABLET, DELAYED RELEASE ORAL at 14:34

## 2018-07-26 NOTE — H&P ADULT - PROBLEM SELECTOR PLAN 3
- Holding home Lisinopril 2.5mg for now in setting of ?UGIB - Bed bound, contracted, and non verbal dating back to [facility] in 2014.  - No bed sores appreciated on physical exam today  - Frequent position changes to avoid decub

## 2018-07-26 NOTE — H&P ADULT - PROBLEM SELECTOR PROBLEM 5
Hyperlipidemia CAD (Coronary Artery Disease) Essential hypertension Type 2 diabetes mellitus without complication, with long-term current use of insulin

## 2018-07-26 NOTE — H&P ADULT - NSHPLABSRESULTS_GEN_ALL_CORE
13.6   7.46  )-----------( 275      ( 2018 13:48 )             41.6           137  |  95<L>  |  22  ----------------------------<  142<H>  4.2   |  26  |  0.50    Ca    9.1      2018 13:48    TPro  7.3  /  Alb  3.3  /  TBili  0.5  /  DBili  x   /  AST  32  /  ALT  19  /  AlkPhos  93                Urinalysis Basic - ( 2018 14:55 )    Color: YELLOW / Appearance: HAZY / S.016 / pH: 8.0  Gluc: NEGATIVE / Ketone: NEGATIVE  / Bili: NEGATIVE / Urobili: NORMAL mg/dL   Blood: NEGATIVE / Protein: 10 mg/dL / Nitrite: NEGATIVE   Leuk Esterase: NEGATIVE / RBC: 5-10 / WBC 2-5   Sq Epi: x / Non Sq Epi: x / Bacteria: x        PT/INR - ( 2018 13:48 )   PT: 12.2 SEC;   INR: 1.06          PTT - ( 2018 13:48 )  PTT:25.2 SEC    Lactate Trend            CAPILLARY BLOOD GLUCOSE      POCT Blood Glucose.: 160 mg/dL (2018 12:54)        < from: CT Head No Cont (18 @ 17:10) >    IMPRESSION:       No acute intracranial hemorrhage, brain edema, or mass effect.   Since 2017 the ventricles have continued to increase in size which   may represent a combination of atrophy and communicating hydrocephalus.   Extensive white matter ischemic changes have progressed and nonacute   infarcts in the right frontal and right temporal lobe have developed   since the prior exam 2017.    < end of copied text > EKG, 18, sinus tachycardia, 112bpm, qtc 475, no acute Tw or ST changes - my reading           137  |  95<L>  |  22  ----------------------------<  142<H>  4.2   |    |  0.50    Ca    9.1      2018 13:48    TPro  7.3  /  Alb  3.3  /  TBili  0.5  /  DBili  x   /  AST  32  /  ALT  19  /  AlkPhos  93                Urinalysis Basic - ( 2018 14:55 )    Color: YELLOW / Appearance: HAZY / S.016 / pH: 8.0  Gluc: NEGATIVE / Ketone: NEGATIVE  / Bili: NEGATIVE / Urobili: NORMAL mg/dL   Blood: NEGATIVE / Protein: 10 mg/dL / Nitrite: NEGATIVE   Leuk Esterase: NEGATIVE / RBC: 5-10 / WBC 2-5   Sq Epi: x / Non Sq Epi: x / Bacteria: x        PT/INR - ( 2018 13:48 )   PT: 12.2 SEC;   INR: 1.06          PTT - ( 2018 13:48 )  PTT:25.2 SEC    Lactate Trend            CAPILLARY BLOOD GLUCOSE      POCT Blood Glucose.: 160 mg/dL (2018 12:54)        < from: CT Head No Cont (18 @ 17:10) >    IMPRESSION:       No acute intracranial hemorrhage, brain edema, or mass effect.   Since 2017 the ventricles have continued to increase in size which   may represent a combination of atrophy and communicating hydrocephalus.   Extensive white matter ischemic changes have progressed and nonacute   infarcts in the right frontal and right temporal lobe have developed   since the prior exam 2017.    < end of copied text >

## 2018-07-26 NOTE — H&P ADULT - ASSESSMENT
72 y/o non verbal, bed bound Male w/ a pmh significant for essential HTN, HLD, DM2, CAD, BPH, prior SAH admitted for coffee ground emesis c/f UGIB complicated by lactic acidosis 74 y/o non verbal, bed bound Male, s/p PEG-tube (on Glucerna 1.2 1300ml @80ml/hr with 35ml water flush per hr for 16 hrs - per transfer documents) w/ h/o essential HTN, HLD, DM Type 2, CAD, BPH, prior SAH (2014)  admitted with likely UGIB due to possible gastric vs PEG tube site ulceration complicated by lactic acidosis in the context of dehydration and Metformin regimen;

## 2018-07-26 NOTE — ED PROVIDER NOTE - PROGRESS NOTE DETAILS
CT head read with no acute changes and just worsening hydrocephalus likely 2/2 to atrophy. Stool guaiac positive. Admit to medicine.

## 2018-07-26 NOTE — H&P ADULT - LYMPHATIC
supraclavicular L/posterior cervical R/supraclavicular R/anterior cervical L/posterior cervical L/anterior cervical R

## 2018-07-26 NOTE — ED ADULT NURSE NOTE - OBJECTIVE STATEMENT
Pt rcvd to room 11, [facility] from [facility] accompanied by wife. Pt is nonverbal, opens eyes spontaneously. As per wife, this is his baseline. Contracted legs and arms. 0.5cm x 0.5cm stage 1 ulcer to L upper chest wall. Otherwise skin intact. PEG tube observed in place. No drainage/bleeding observed round site. Sent in by nursing home for N/V today with dark, coffee ground emesis. Pt appears comfortable, nonverbal indicator of pain not present. Abd soft, nontender at this time. Sinus tachycardia on cardiac monitor. Afebrile. Will continue to monitor.

## 2018-07-26 NOTE — H&P ADULT - PROBLEM SELECTOR PLAN 4
- FSG's well controlled in ED  - c/w ISS - Multifactorial likely in setting of dehydration as BUN:Cr 40 w/ hx of Metformin use which will increase baseline lactate  - Will begin maintenance NS @100cc/h stop after 15h and reassess w/ AM labs  - Low suspicion for overt infection or acute abdomen at this time - Multifactorial likely in setting of dehydration as BUN:Cr 40 w/ Metformin use;    - Will begin maintenance NS @100cc/h stop after 15h and reassess w/ AM labs  - Low suspicion for overt infection or acute abdomen at this time

## 2018-07-26 NOTE — H&P ADULT - PROBLEM SELECTOR PROBLEM 4
CAD (Coronary Artery Disease) Type 2 diabetes mellitus without complication, with long-term current use of insulin Lactic acidosis

## 2018-07-26 NOTE — ED ADULT TRIAGE NOTE - CHIEF COMPLAINT QUOTE
Pt from [facility] home arrives with peg tube in place. Pt. brought in for vomiting x 2 days , per ems sent in to r/o gi bleed

## 2018-07-26 NOTE — H&P ADULT - HISTORY OF PRESENT ILLNESS
74 y/o non verbal, bed bound Male w/ a pmh significant for essential HTN, HLD, DM2, CAD, BPH, prior SAH who presents from his nursing facility presenting to the ED w/ a chief complaint of coffee ground emesis. Per transfer note, pt had two episodes of coffee ground emesis not associated with any abdominal pain. Per pt wife, emesis was nbnb yesterday evening but was noticeably more somnolent at times. No reported fevers, chills, changes in mental status, CP, SOB, abd pain, melena or hematochezia    In the ED, pt afebrile, -136/74-99, -115, RR 16 (100% on RA). Hb 13.6, VBG Lactate 3.3. Occult positive. CT Head shows no ICH w/ worsening hydrocephalus. Pt given Protonix 80mg x1 and subsequently started on gtt. 72 y/o non verbal, bed bound Male w/ a pmh significant for essential HTN, HLD, DM2, CAD, BPH, prior SAH (2014) presenting to the ED from his nursing facility w/ a chief complaint of coffee ground emesis. Per transfer note, pt had two episodes of coffee ground emesis not associated with any LOC, abd pain, melena or hematochezia. Per pt wife however, emesis was nbnb yesterday evening but pt was noticeably more somnolent at times throughout the day. No reported fevers, chills, changes in mental status, CP, SOB, abd pain, melena or hematochezia    In the ED, pt afebrile, -136/74-99, -115, RR 16 (100% on RA). Hb 13.6, VBG Lactate 3.3. Occult positive. CT Head shows no ICH w/ worsening hydrocephalus. Pt given Protonix 80mg x1, started on gtt, and subsequently admitted to medicine for management of possible UGIB. 72 y/o non verbal, bed bound Male w/ a pmh significant for essential HTN, HLD, DM Type 2, CAD, BPH, prior SAH (2014) presenting to the ED from his nursing facility w/ a chief complaint of coffee ground emesis. Per transfer note, pt had two episodes of coffee ground emesis not associated with any LOC, abd pain, melena or hematochezia. Per pt wife however, emesis was nbnb yesterday evening but pt was noticeably more somnolent at times throughout the day. No reported fevers, chills, changes in mental status, CP, SOB, abd pain, melena or hematochezia    In the ED, pt afebrile, -136/74-99, -115, RR 16 (100% on RA). Hb 13.6, VBG Lactate 3.3. Occult positive. CT Head shows no ICH w/ worsening hydrocephalus. Pt given Protonix 80mg x1, started on gtt, and subsequently admitted to medicine for management of possible UGIB. 72 y/o non verbal, bed bound Male, s/p PEG-tube (on Glucerna 1.2 1300ml @80ml/hr with 35ml water flush per hr for 16 hrs - per transfer documents) w/ h/o essential HTN, HLD, DM Type 2, CAD, BPH, prior SAH (2014) presenting to the ED from his nursing facility w/ a chief complaint of coffee ground emesis. Per transfer note, pt had two episodes of coffee ground emesis not associated with any LOC, abd pain, melena or hematochezia. Per pt wife however, emesis was nbnb yesterday evening but pt was noticeably more somnolent at times throughout the day. No reported fevers, chills, changes in mental status, CP, SOB, abd pain, melena or hematochezia    In the ED, pt afebrile, -136/74-99, -115, RR 16 (100% on RA). Hb 13.6, VBG Lactate 3.3. Occult positive. CT Head shows no ICH w/ worsening hydrocephalus. Pt given Protonix 80mg x1, started on gtt, and subsequently admitted to medicine for management of possible UGIB.

## 2018-07-26 NOTE — ED PROVIDER NOTE - OBJECTIVE STATEMENT
Pt is a 72yo M with PMHx of DM, CAD and BPH p/w coffee ground emesis since yesterday afternoon. Pt unable to speak at baseline and is coming from [facility]. As per the transfer note, the patient has been having coffee ground emesis since yesterday evening without any abdominal tenderness. Mylanta 30ml was given yesterday and a fleet enema was delivered today at 10:30am with large soft stool noted. Patient's wife at bedside, but unable to provide more history as she was not present for this event. Pt is a 74yo M with PMHx of DM, CAD and BPH p/w coffee ground emesis since yesterday afternoon. Pt unable to speak at baseline and is coming from [facility]. As per the transfer note, the patient has been having coffee ground emesis since yesterday evening without any abdominal tenderness. Mylanta 30ml was given yesterday and a fleet enema was delivered today at 10:30am with large soft stool noted. Patient's wife at bedside, but unable to provide more history as she was not present for this event. Pt's wife endorsed wanting blood products if necessary. Pt is a 72yo M with PMHx of DM, CAD and BPH p/w coffee ground emesis since yesterday afternoon. Pt unable to speak at baseline and is coming from [facility]. As per the transfer note, the patient has been having coffee ground emesis since yesterday evening without any abdominal tenderness. Mylanta 30ml was given yesterday and a fleet enema was delivered today at 10:30am with large soft stool noted. Patient's wife at bedside, but unable to provide more history as she was not present for this event. Pt's wife endorsed wanting blood products if necessary. She also states that he seems more somnolent than baseline, but she believes it is because he was up all night. Pt is a 74yo M with PMHx of DM, CAD and BPH p/w coffee ground emesis since yesterday afternoon. Pt unable to speak at baseline and is coming from [facility]. As per the transfer note, the patient has been having coffee ground emesis since yesterday evening without any abdominal tenderness. Mylanta 30ml was given yesterday and a fleet enema was delivered today at 10:30am with large soft stool noted. Patient's wife at bedside, but unable to provide more history as she was not present for this event. Pt's wife endorsed wanting blood products if necessary. She also states that he seems more somnolent than baseline, but she believes it is because he was up all night.    Attending/Salud: 74 yo M N[facility] ([facility]) h/o DM, CAD, SAH, at baseline bedbound, nonverbal, non-communicative referred tot he ED for episodes of vomiting since yesterday. As per NH note pt had coffee-ground emesis yesterday, today as per his wife, vomitus was clear.

## 2018-07-26 NOTE — H&P ADULT - PROBLEM SELECTOR PLAN 7
- SAH in July 2014 w/ stable CT Heads and Neuro follow up since then  - Will hold ASA in setting of UGIB - Holding ASA in setting of UGIB - ASA not listed on facility medrec, unclear if taken off due to SAH?  - See above

## 2018-07-26 NOTE — ED PROVIDER NOTE - PHYSICAL EXAMINATION
Attending/Salud: +Cachetic, PERRL/EOMI, +dry mucosa, supple, no ALIYAH, no JVD, RRR, CTAB; Abd-soft, NT/ND, +PEG-C/D/I, no HSM; no LE edema, awake, non-responsive, nonfocal, Skin-warm/dry  Rectal Temp-99.4

## 2018-07-26 NOTE — H&P ADULT - NSHPOUTPATIENTPROVIDERS_GEN_ALL_CORE
Olu Anthony (MD), Cardiovascular Disease  [address] [place] [address]  Phone: [phone]  Fax: [phone].

## 2018-07-26 NOTE — H&P ADULT - PROBLEM SELECTOR PLAN 5
- Holding ASA in setting of UGIB - Holding home Lisinopril 2.5mg for now in setting of ?UGIB HOLD insuline given NPO status   - c/w IHSS q4h HOLD insuline Humulin N given NPO status and BG in low 100s;   - c/w IHSS q4h

## 2018-07-26 NOTE — H&P ADULT - PROBLEM SELECTOR PROBLEM 3
Type 2 diabetes mellitus without complication, with long-term current use of insulin Essential hypertension Debility

## 2018-07-26 NOTE — ED PROVIDER NOTE - MEDICAL DECISION MAKING DETAILS
72yo M with a history of SAH, T2DM, CAD, BPH p/w vomiting with coffee ground emesis. Guaiac positive and thus suggests the presence of an UGI bleed and thus will need to be admitted. The wife states that he seems more somnolent than usual and thus a CT head will be obtained to determine if there has been another SAH.

## 2018-07-26 NOTE — H&P ADULT - PROBLEM SELECTOR PLAN 1
- Two reported episodes of coffee ground emesis at nursing facility however per pt wife- most recent bout of emesis was clear liquid  - No active signs of overt GI bleed at this time. ERIS w/ brown stool. Guaiac positive in ED.   - s/p Protonix 80mg x1 in ED and started on Protonix gtt  - Zofran q6h prn nausea/vomiting  - GI consulted, appreciate recs   - NPO except meds - Two reported episodes of coffee ground emesis at nursing facility however per pt wife- most recent bout of emesis was clear liquid  - No active signs of overt GI bleed at this time. ERIS w/ brown stool. Guaiac positive in ED.   - s/p Protonix 80mg x1 in ED and started on gtt. Will begin Protonix 40mg IV BID   - Zofran q6h prn nausea/vomiting  - GI consulted, appreciate recs   - NPO except meds - Two reported episodes of coffee ground emesis at nursing facility however per pt wife- most recent bout of emesis was clear liquid  - No active signs of overt GI bleed at this time. ERIS w/ brown stool. Guaiac positive in ED.   - s/p Protonix 80mg x1 in ED and started on gtt. Will begin Protonix 40mg IV BID   - Zofran q6h prn nausea/vomiting  - GI consulted, appreciate recs   - NPO, hold PEG feeds  - HOLD all non-essential oral medications

## 2018-07-26 NOTE — H&P ADULT - PROBLEM SELECTOR PLAN 2
- Afebrile, no leukocytosis w/ no evidence of overt infection to contribute to somnolence. Likely 2/2 lack of sleep over last 24 h.   - CT Head negative for ICH w/ increasing hydrocephalus likely 2/2 atrophy  - AAOx0, at baseline mental status at this time

## 2018-07-26 NOTE — H&P ADULT - PROBLEM SELECTOR PLAN 6
- c/w atorvastatin 20 qhs - FSG's well controlled in ED  - c/w ISS Significant discrepancies between medications list provided by the facility and medications list provided by a family member;  -Clarify home medications with the facility in AM (primary team)

## 2018-07-26 NOTE — H&P ADULT - NSHPPHYSICALEXAM_GEN_ALL_CORE
PHYSICAL EXAM:    General: No acute distress.  HEENT: Normocephalic, atraumatic.  PERRL.  EOMI.  No scleral icterus.  Moist MM.  No oropharyngeal exudates.    Neck: Supple.  Full range of motion.  No JVD.  No carotid bruits.  No thyromegaly.  Trachea midline.  No lymphadenopathy.   Heart: RRR.  Normal S1 and S2.  No murmurs, rubs, or gallops.   Lungs: CTAB. No wheezes, crackles, or rhonchi.    Abdomen: BS+, soft, NT/ND.  No organomegaly.  Skin: Warm and dry.  No rashes.  Extremities: No edema, clubbing, or cyanosis.  2+ peripheral pulses b/l.  Musculoskeletal: No deformities.  No spinal or paraspinal tenderness.  Neuro: A&Ox3.  CN II-XII intact.  5/5 strength in UE and LE b/l.  Tactile sensation intact in UE and LE b/l.  Cerebellar function intact as assessed by finger-to-nose test. PHYSICAL EXAM:    General: No acute distress. Cachectic appearing  HEENT: Normocephalic, atraumatic.  PERRL. Dry mucous membranes   Neck: Supple.  Full range of motion.    Heart: RRR.  Normal S1 and S2.  No murmurs, rubs, or gallops.   Lungs: CTAB. No wheezes, crackles, or rhonchi.    Abdomen: BS+, soft, NT/ND.  No organomegaly. +PEG c/d/i  Skin: Warm and dry.  No rashes.  Extremities: No edema, clubbing, or cyanosis.  2+ peripheral pulses b/l.  Neuro: Non verbal AAOx0  Rectal: brown stool PHYSICAL EXAM:    General: No acute distress. Cachectic appearing  HEENT: Normocephalic, atraumatic.  PERRL. Dry mucous membranes   Neck: Supple.  Full range of motion.    Heart: RRR.  Normal S1 and S2.  No murmurs, rubs, or gallops.   Lungs: CTAB. No wheezes, crackles, or rhonchi.    Abdomen: BS+, soft, NT/ND.  No organomegaly. +PEG-tube, site clean, no erythema or discharge   Skin: Warm and dry.  No rashes.  Extremities: No edema, clubbing, or cyanosis.  2+ peripheral pulses b/l.  Neuro: Non verbal AAOx0, does not follow commands, not lethargic, moves minimally b/l LE and UE, contracted   Rectal: brown stool, occult blood (+)

## 2018-07-26 NOTE — H&P ADULT - PROBLEM SELECTOR PLAN 9
- DVT PPx: SCD's  - Diet: NPO except meds - SAH in July 2014 w/ stable CT Heads and Neuro follow up since then  - Will hold ASA in setting of UGIB - SAH in July 2014 w/ stable CT Heads and Neuro follow up since then - SAH in July 2014 w/ stable CT Heads and Neuro follow up since then  - Hold Modafinil while NPO

## 2018-07-27 VITALS
TEMPERATURE: 98 F | SYSTOLIC BLOOD PRESSURE: 125 MMHG | DIASTOLIC BLOOD PRESSURE: 79 MMHG | HEART RATE: 97 BPM | RESPIRATION RATE: 18 BRPM | OXYGEN SATURATION: 100 %

## 2018-07-27 DIAGNOSIS — Z79.899 OTHER LONG TERM (CURRENT) DRUG THERAPY: ICD-10-CM

## 2018-07-27 LAB
BASE EXCESS BLDV CALC-SCNC: 2.9 MMOL/L — SIGNIFICANT CHANGE UP
BLOOD GAS VENOUS - CREATININE: 0.49 MG/DL — LOW (ref 0.5–1.3)
BUN SERPL-MCNC: 21 MG/DL — SIGNIFICANT CHANGE UP (ref 7–23)
CALCIUM SERPL-MCNC: 8.5 MG/DL — SIGNIFICANT CHANGE UP (ref 8.4–10.5)
CHLORIDE BLDV-SCNC: 105 MMOL/L — SIGNIFICANT CHANGE UP (ref 96–108)
CHLORIDE SERPL-SCNC: 101 MMOL/L — SIGNIFICANT CHANGE UP (ref 98–107)
CO2 SERPL-SCNC: 25 MMOL/L — SIGNIFICANT CHANGE UP (ref 22–31)
CREAT SERPL-MCNC: 0.59 MG/DL — SIGNIFICANT CHANGE UP (ref 0.5–1.3)
GAS PNL BLDV: 137 MMOL/L — SIGNIFICANT CHANGE UP (ref 136–146)
GLUCOSE BLDC GLUCOMTR-MCNC: 108 MG/DL — HIGH (ref 70–99)
GLUCOSE BLDC GLUCOMTR-MCNC: 110 MG/DL — HIGH (ref 70–99)
GLUCOSE BLDC GLUCOMTR-MCNC: 140 MG/DL — HIGH (ref 70–99)
GLUCOSE BLDC GLUCOMTR-MCNC: 170 MG/DL — HIGH (ref 70–99)
GLUCOSE BLDV-MCNC: 106 — HIGH (ref 70–99)
GLUCOSE SERPL-MCNC: 95 MG/DL — SIGNIFICANT CHANGE UP (ref 70–99)
HBA1C BLD-MCNC: 6.6 % — HIGH (ref 4–5.6)
HCO3 BLDV-SCNC: 27 MMOL/L — SIGNIFICANT CHANGE UP (ref 20–27)
HCT VFR BLD CALC: 35 % — LOW (ref 39–50)
HCT VFR BLD CALC: 37.7 % — LOW (ref 39–50)
HCT VFR BLDV CALC: 39.7 % — SIGNIFICANT CHANGE UP (ref 39–51)
HGB BLD-MCNC: 11.3 G/DL — LOW (ref 13–17)
HGB BLD-MCNC: 12.2 G/DL — LOW (ref 13–17)
HGB BLDV-MCNC: 12.9 G/DL — LOW (ref 13–17)
LACTATE BLDV-MCNC: 1.1 MMOL/L — SIGNIFICANT CHANGE UP (ref 0.5–2)
MAGNESIUM SERPL-MCNC: 2.1 MG/DL — SIGNIFICANT CHANGE UP (ref 1.6–2.6)
MCHC RBC-ENTMCNC: 30.3 PG — SIGNIFICANT CHANGE UP (ref 27–34)
MCHC RBC-ENTMCNC: 30.8 PG — SIGNIFICANT CHANGE UP (ref 27–34)
MCHC RBC-ENTMCNC: 32.3 % — SIGNIFICANT CHANGE UP (ref 32–36)
MCHC RBC-ENTMCNC: 32.4 % — SIGNIFICANT CHANGE UP (ref 32–36)
MCV RBC AUTO: 93.8 FL — SIGNIFICANT CHANGE UP (ref 80–100)
MCV RBC AUTO: 95.2 FL — SIGNIFICANT CHANGE UP (ref 80–100)
NRBC # FLD: 0 — SIGNIFICANT CHANGE UP
NRBC # FLD: 0 — SIGNIFICANT CHANGE UP
PCO2 BLDV: 44 MMHG — SIGNIFICANT CHANGE UP (ref 41–51)
PH BLDV: 7.41 PH — SIGNIFICANT CHANGE UP (ref 7.32–7.43)
PHOSPHATE SERPL-MCNC: 4.3 MG/DL — SIGNIFICANT CHANGE UP (ref 2.5–4.5)
PLATELET # BLD AUTO: 242 K/UL — SIGNIFICANT CHANGE UP (ref 150–400)
PLATELET # BLD AUTO: 257 K/UL — SIGNIFICANT CHANGE UP (ref 150–400)
PMV BLD: 11.4 FL — SIGNIFICANT CHANGE UP (ref 7–13)
PMV BLD: 11.6 FL — SIGNIFICANT CHANGE UP (ref 7–13)
PO2 BLDV: 63 MMHG — HIGH (ref 35–40)
POTASSIUM BLDV-SCNC: 3.4 MMOL/L — SIGNIFICANT CHANGE UP (ref 3.4–4.5)
POTASSIUM SERPL-MCNC: 3.5 MMOL/L — SIGNIFICANT CHANGE UP (ref 3.5–5.3)
POTASSIUM SERPL-SCNC: 3.5 MMOL/L — SIGNIFICANT CHANGE UP (ref 3.5–5.3)
RBC # BLD: 3.73 M/UL — LOW (ref 4.2–5.8)
RBC # BLD: 3.96 M/UL — LOW (ref 4.2–5.8)
RBC # FLD: 15.2 % — HIGH (ref 10.3–14.5)
RBC # FLD: 15.4 % — HIGH (ref 10.3–14.5)
SAO2 % BLDV: 91.5 % — HIGH (ref 60–85)
SODIUM SERPL-SCNC: 141 MMOL/L — SIGNIFICANT CHANGE UP (ref 135–145)
WBC # BLD: 6.67 K/UL — SIGNIFICANT CHANGE UP (ref 3.8–10.5)
WBC # BLD: 6.94 K/UL — SIGNIFICANT CHANGE UP (ref 3.8–10.5)
WBC # FLD AUTO: 6.67 K/UL — SIGNIFICANT CHANGE UP (ref 3.8–10.5)
WBC # FLD AUTO: 6.94 K/UL — SIGNIFICANT CHANGE UP (ref 3.8–10.5)

## 2018-07-27 PROCEDURE — 99222 1ST HOSP IP/OBS MODERATE 55: CPT | Mod: GC

## 2018-07-27 PROCEDURE — 99239 HOSP IP/OBS DSCHRG MGMT >30: CPT

## 2018-07-27 RX ORDER — METFORMIN HYDROCHLORIDE 850 MG/1
1 TABLET ORAL
Qty: 0 | Refills: 0 | COMMUNITY

## 2018-07-27 RX ORDER — INSULIN LISPRO 100/ML
VIAL (ML) SUBCUTANEOUS EVERY 4 HOURS
Qty: 0 | Refills: 0 | Status: DISCONTINUED | OUTPATIENT
Start: 2018-07-27 | End: 2018-07-27

## 2018-07-27 RX ADMIN — PANTOPRAZOLE SODIUM 40 MILLIGRAM(S): 20 TABLET, DELAYED RELEASE ORAL at 02:00

## 2018-07-27 NOTE — PROGRESS NOTE ADULT - PROBLEM SELECTOR PLAN 6
Significant discrepancies between medications list provided by the facility and medications list provided by a family member;  -Clarify home medications with the facility in AM (primary team)

## 2018-07-27 NOTE — PROGRESS NOTE ADULT - PROBLEM SELECTOR PLAN 4
- Multifactorial likely in setting of dehydration as BUN:Cr 40 w/ Metformin use;    - Will begin maintenance NS @100cc/h stop after 15h and reassess w/ AM labs  - Low suspicion for overt infection or acute abdomen at this time

## 2018-07-27 NOTE — CONSULT NOTE ADULT - ASSESSMENT
Impression:  1) Coffee ground emesis- Appears resolved  -Differential diagnosis includes PUD, angiectasia erosive gastropathy/esophagitis, malignancy    2) PEG tube status    Recommendation:  - Currently H/H stable, VSS with tube flushed and return of yellow fluid  - At this time, we would hold off EGD for now  - If clinical changes change, please let us know  - C/w serial CBC, transfuse to hgb of 7  - Rest of care per primary team    Elzbieta Goodwin, PGY4  Gastroenterology and Hepatology Fellow  Pager # [phone] / [phone]

## 2018-07-27 NOTE — DISCHARGE NOTE ADULT - HOSPITAL COURSE
Pt is a 73 year old male with a pmh of subarachnoid hemorrhage in 2014, bedbound, from nursing home, who came in d/t two episodes of coffee ground emesis at his nursing home facility. Pt was evaluated here where he had a hgb of 13.5, which later dropped to 11.3 overnight. His occult blood was positive, however pt's bm were nonblack, nonbloody, and normal caliber. Pt had no episodes of emesis at the hospital. GI found no blood in the PEG tube, and sampling the stomach fluid did not show any blood. GI did not pursue any additional studies such as endoscopy given that there was no signs of further bleeding. Pt's repeat hgb this afternoon was 12.2 and stable. Pt ruled out for any current GI bleeding, and hemodynamically stable. Pt is to be discharged back to nursing facility given his stable state. Patient is a 73 year-old male with a pmh of subarachnoid hemorrhage in 2014, bedbound, from nursing home, who presented with two episodes of coffee ground emesis at his nursing home facility. Pt was evaluated here where he had a hemoglobin of 13.5, which later dropped to 11.3 overnight. His occult blood was positive, however patient had brown normal appearing bowel movements, nonbloody, and normal in caliber. Patient had no episodes of emesis at the hospital. GI found no blood in the PEG tube on checking for residual gastric contents, and sampling the stomach fluid did not show any bright red blood. GI did not pursue any additional studies such as endoscopy given that there was no signs of further bleeding, with repeat hemoglobin stable at 11.5 and stable vital signs. Pt's repeat hgb this afternoon was 12.2 prior to discharge. Patient is clinically and hemodynamically stable. Pt is to be discharged back to nursing facility given his stable state and prior feeds can be resumed.

## 2018-07-27 NOTE — CONSULT NOTE ADULT - SUBJECTIVE AND OBJECTIVE BOX
Chief Complaint:  Patient is a 73y old  Male who presents with a chief complaint of Sent in for coffee ground vomitting (2018 23:44)      Interval Events: 72 y/o non verbal, bed bound M s/p PEG-tube (on Glucerna 1.2 1300ml @80ml/hr with 35ml water flush per hr for 16 hrs - per transfer documents, HTN, HLD, DM Type 2, CAD, BPH, prior SAH () presents from nursing facility with reported 2 episodes of coffee ground emesis. This is not associated with melena, abdominal pain, hematochezia. Per pt's wife, emesis was NBNB yesterday after coffee ground emesis. Pt is unable to give any history.    In the ED, pt's VSS. Pt was given Protonix 80mh x1 IV, started on PPI IV BID and admitted to medicine for management of possible UGIB. Hgb 13.6 -> 11.3.     Allergies:  No Known Allergies        Hospital Medications:  dextrose 40% Gel 15 Gram(s) Oral once PRN  dextrose 5%. 1000 milliLiter(s) IV Continuous <Continuous>  dextrose 50% Injectable 12.5 Gram(s) IV Push once  dextrose 50% Injectable 25 Gram(s) IV Push once  dextrose 50% Injectable 25 Gram(s) IV Push once  glucagon  Injectable 1 milliGRAM(s) IntraMuscular once PRN  insulin lispro (HumaLOG) corrective regimen sliding scale   SubCutaneous every 4 hours  ondansetron Injectable 4 milliGRAM(s) IV Push every 6 hours PRN  pantoprazole  Injectable 40 milliGRAM(s) IV Push every 12 hours  sodium chloride 0.9%. 1000 milliLiter(s) IV Continuous <Continuous>      PMHX/PSHX:  Arthritis  Hemorrhoids, unspecified hemorrhoid type  SAH (subarachnoid hemorrhage)  BPH (benign prostatic hypertrophy)  Gastritis  Dyslipidemia  HTN (Hypertension), Benign  Diabetes  CAD (Coronary Artery Disease)  Stented coronary artery  Appendicitis      Family history:  Family history of liver disease (Mother)  Cerebrovascular accident (CVA) (Father)  No significant family history      ROS:     Unable to obtain.       PHYSICAL EXAM:   Vital Signs:  Vital Signs Last 24 Hrs  T(C): 36.8 (2018 06:06), Max: 37.6 (2018 13:21)  T(F): 98.2 (2018 06:06), Max: 99.6 (2018 13:21)  HR: 94 (2018 06:06) (94 - 115)  BP: 117/71 (2018 06:06) (112/74 - 136/85)  BP(mean): --  RR: 18 (2018 06:06) (16 - 18)  SpO2: 98% (2018 06:06) (98% - 100%)  Daily     Daily     GENERAL:  cachetic, contracted, non verbal, open eyes to verbal commands  HEENT:  NC/AT,  conjunctivae clear and pink, sclera -anicteric  CHEST:  Full & symmetric excursion, no increased effort, breath sounds clear  HEART:  Regular rhythm, S1, S2, no murmur/rub/S3/S4, no abdominal bruit, no edema  ABDOMEN:  PEG in place c/d/i, tube flushed with return of yellow fluid   EXTEREMITIES:  no cyanosis,clubbing or edema  SKIN:  No rash/erythema/ecchymoses/petechiae/wounds/abscess/warm/dry    LABS:                        11.3   6.94  )-----------( 242      ( 2018 06:00 )             35.0     Mean Cell Volume: 93.8 fL (-18 @ 06:00)    -    141  |  101  |  21  ----------------------------<  95  3.5   |  25  |  0.59    Ca    8.5      2018 06:00  Phos  4.3       Mg     2.1         TPro  7.3  /  Alb  3.3  /  TBili  0.5  /  DBili  x   /  AST  32  /  ALT  19  /  AlkPhos  93  07-26    LIVER FUNCTIONS - ( 2018 13:48 )  Alb: 3.3 g/dL / Pro: 7.3 g/dL / ALK PHOS: 93 u/L / ALT: 19 u/L / AST: 32 u/L / GGT: x           PT/INR - ( 2018 13:48 )   PT: 12.2 SEC;   INR: 1.06          PTT - ( 2018 13:48 )  PTT:25.2 SEC  Urinalysis Basic - ( 2018 14:55 )    Color: YELLOW / Appearance: HAZY / S.016 / pH: 8.0  Gluc: NEGATIVE / Ketone: NEGATIVE  / Bili: NEGATIVE / Urobili: NORMAL mg/dL   Blood: NEGATIVE / Protein: 10 mg/dL / Nitrite: NEGATIVE   Leuk Esterase: NEGATIVE / RBC: 5-10 / WBC 2-5   Sq Epi: x / Non Sq Epi: x / Bacteria: x                              11.3   6.94  )-----------( 242      ( 2018 06:00 )             35.0                         13.6   7.46  )-----------( 275      ( 2018 13:48 )             41.6     Imaging:

## 2018-07-27 NOTE — CONSULT NOTE ADULT - ATTENDING COMMENTS
Patient seen/examined. Bedbound debilitated nursing home patient with PEG admitted for coffee ground emesis. PE/labs as noted. Abdomen-soft, nontender, nondistended and without mass or hepatosplenomegaly. GT site dry and intact-20 Welsh non-balloon GT in place. GT aspirated-no blood. Hemoglobin stable as noted. No indication of hemodynamically significant active ongoing upper GI bleed. Possibly had coffee grounds related to GERD and esophagitis. Possibility of PUD also considered. Either way, as noted, currently stable without indication of active ongoing GI bleeding. In view of patient's debility and current stability from standpoint of hemoglobin and vital signs advise conservative measures including antireflux precautions with maintenance of head of bed elevation at 35° at all times. Pantoprazole 40 mg b.i.d. Monitor serial hemoglobin/hematocrit. Consider EGD in event of recurrence of significant hematemesis.

## 2018-07-27 NOTE — DISCHARGE NOTE ADULT - COMMUNITY RESOURCES
[facility] [address] [place] [address] [phone]    . [facility] [phone]

## 2018-07-27 NOTE — PROGRESS NOTE ADULT - PROBLEM SELECTOR PLAN 1
- Two reported episodes of coffee ground emesis at nursing facility however per pt wife- most recent bout of emesis was clear liquid  - No active signs of overt GI bleed at this time. ERIS w/ brown stool. Guaiac positive in ED.   - s/p Protonix 80mg x1 in ED and started on gtt. Will begin Protonix 40mg IV BID   - Zofran q6h prn nausea/vomiting  - GI consulted, appreciate recs   - NPO, hold PEG feeds  - HOLD all non-essential oral medications - Two reported episodes of coffee ground emesis at nursing facility however per pt wife- most recent bout of emesis was clear liquid  - No active signs of overt GI bleed at this time. ERIS w/ brown stool. Guaiac positive in ED.   - s/p Protonix 80mg x1 in ED and started on gtt. Will begin Protonix 40mg IV BID   - Zofran q6h prn nausea/vomiting  - GI consulted, appreciate recs   - NPO, hold PEG feeds  - HOLD all non-essential oral medications  - GI Recs holding off on EGD, bleeding appears resolved. H&H is stable.   - Will d/c pt back to nursing home.

## 2018-07-27 NOTE — DISCHARGE NOTE ADULT - PATIENT PORTAL LINK FT
You can access the [facility] Patient Portal, offered by [facility], by registering with the following website: [email or web address]

## 2018-07-27 NOTE — CONSULT NOTE ADULT - ASSESSMENT
1) GI bleed - conservative management, as per GI    2) Chronic CVA - CT head shows old strokes no ASA sec to GI bleed and h/o SAH     3) h/o CAD - no ASA sec to h/o SAH and GI bleed

## 2018-07-27 NOTE — DISCHARGE NOTE ADULT - CARE PLAN
Principal Discharge DX:	Coffee ground emesis  Goal:	To evaluate and stop bleeding  Assessment and plan of treatment:	You came in with vomiting consistent with coffee ground consistency. This may have been a sign of bleeding in your stomach. This could be due to many things such as an ulcer from your gastritis or from your PEG tube. However, your blood count is now stable, and you are no longer bleeding. We recommend that you follow up with your primary care doctor within 1 week of discharge at your nursing home.

## 2018-07-27 NOTE — DISCHARGE NOTE ADULT - PLAN OF CARE
To evaluate and stop bleeding You came in with vomiting consistent with coffee ground consistency. This may have been a sign of bleeding in your stomach. This could be due to many things such as an ulcer from your gastritis or from your PEG tube. However, your blood count is now stable, and you are no longer bleeding. We recommend that you follow up with your primary care doctor within 1 week of discharge at your nursing home.

## 2018-07-27 NOTE — DISCHARGE NOTE ADULT - CONDITIONS AT DISCHARGE
Mr Shikha is stable for [facility] to his [facility].  He remains alert , oriented x 1,nonverbal mostly; total assistances for al of his care.  Aspiration precautions are taken.  Left side partially contracted ; Skin Tear on the left Knee periphery is pink ..3cm x.5cm x.15cm with 3M Liquid Barrier Film.  Abdominal is stage 0.3cm x 0.5cm 3M and Foam to area applied to ulceration around PEG site.  Summaries are sent .

## 2018-07-27 NOTE — PROGRESS NOTE ADULT - ASSESSMENT
72 y/o non verbal, bed bound Male, s/p PEG-tube (on Glucerna 1.2 1300ml @80ml/hr with 35ml water flush per hr for 16 hrs - per transfer documents) w/ h/o essential HTN, HLD, DM Type 2, CAD, BPH, prior SAH (2014)  admitted with likely UGIB due to possible gastric vs PEG tube site ulceration complicated by lactic acidosis in the context of dehydration and Metformin regimen;

## 2018-07-27 NOTE — CONSULT NOTE ADULT - SUBJECTIVE AND OBJECTIVE BOX
Olu Anthony MD  Interventional Cardiology / Advance Heart Failure and Cardiac Transplant Specialist  [place] Office : [address] [place] [address] [place] [address]  Tel:   [place] Office : [address] [facility] [place] [address]  Tel: [phone]  Cell : [phone]    HISTORY OF PRESENT ILLNESS:  72 y/o non verbal, bed bound Male, s/p PEG-tube (on Glucerna 1.2 1300ml @80ml/hr with 35ml water flush per hr for 16 hrs - per transfer documents) w/ h/o essential HTN, HLD, DM Type 2, CAD, BPH, prior SAH () presenting to the ED from his nursing facility w/ a chief complaint of coffee ground emesis. Per transfer note, pt had two episodes of coffee ground emesis not associated with any LOC, abd pain, melena or hematochezia. Per pt wife however, emesis was nbnb yesterday evening but pt was noticeably more somnolent at times throughout the day. No reported fevers, chills, changes in mental status, CP, SOB, abd pain, melena or hematochezia    In the ED, pt afebrile, -136/74-99, -115, RR 16 (100% on RA). Hb 13.6, VBG Lactate 3.3. Occult positive. CT Head shows no ICH w/ worsening hydrocephalus. Pt given Protonix 80mg x1, started on gtt, and subsequently admitted to medicine for management of possible UGIB.  Pt is nonverbal history as per documents and wife    PAST MEDICAL & SURGICAL HISTORY:  Arthritis  Hemorrhoids, unspecified hemorrhoid type  SAH (subarachnoid hemorrhage)  BPH (benign prostatic hypertrophy)  Gastritis  Dyslipidemia  HTN (Hypertension), Benign  Diabetes  CAD (Coronary Artery Disease)  Stented coronary artery  Appendicitis    	    MEDICATIONS:        ondansetron Injectable 4 milliGRAM(s) IV Push every 6 hours PRN      dextrose 40% Gel 15 Gram(s) Oral once PRN  dextrose 50% Injectable 12.5 Gram(s) IV Push once  dextrose 50% Injectable 25 Gram(s) IV Push once  dextrose 50% Injectable 25 Gram(s) IV Push once  glucagon  Injectable 1 milliGRAM(s) IntraMuscular once PRN  insulin lispro (HumaLOG) corrective regimen sliding scale   SubCutaneous every 4 hours    dextrose 5%. 1000 milliLiter(s) IV Continuous <Continuous>      FAMILY HISTORY:  Family history of liver disease (Mother): Mother  from liver disease 40 yo  Cerebrovascular accident (CVA) (Father): Father 82 yo        Allergies    No Known Allergies    Intolerances    	      PHYSICAL EXAM:  T(C): 36.9 (18 @ 11:35), Max: 36.9 (18 @ 11:35)  HR: 98 (18 @ 11:35) (94 - 108)  BP: 132/88 (18 @ 11:35) (117/71 - 132/88)  RR: 18 (18 @ 11:35) (16 - 18)  SpO2: 97% (18 @ 11:35) (97% - 100%)  Wt(kg): --  I&O's Summary    2018 07:01  -  2018 14:11  --------------------------------------------------------  IN: 250 mL / OUT: 0 mL / NET: 250 mL        Appearance: Normal	  HEENT:   Normal oral mucosa, PERRL, EOMI	  Cardiovascular: Normal S1 S2, No JVD, No murmurs, No edema h/o tracheostomy  Respiratory: Lungs clear to auscultation	  Gastrointestinal:  s/p PEG  Extremities: Normal range of motion, No clubbing, cyanosis or edema    LABS:	 	    CARDIAC MARKERS:                                  12.2   6.67  )-----------( 257      ( 2018 11:14 )             37.7         141  |  101  |  21  ----------------------------<  95  3.5   |  25  |  0.59    Ca    8.5      2018 06:00  Phos  4.3       Mg     2.1         TPro  7.3  /  Alb  3.3  /  TBili  0.5  /  DBili  x   /  AST  32  /  ALT  19  /  AlkPhos  93      proBNP:   Lipid Profile:   HgA1c: Hemoglobin A1C, Whole Blood: 6.6 % ( @ 06:00)    TSH:     ASSESSMENT/PLAN:

## 2018-07-27 NOTE — PROGRESS NOTE ADULT - ATTENDING COMMENTS
Patient seen and examined. Patient's wife present at bedside. Pt's wife comments that pt with no further episodes of vomiting while inpatient and pt appears to be at his baseline, which is non-verbal with left-sided hemiplegia.     Mr. Anders is a 72 yo man with hx of intracranial hemorrhage (2014) subequently now non-verbal, bedbound 2/2 functional quadriplegia, s/p PEG placement for dysphagia, HTN, DM Type 2, and CAD admitted for concern of possible UGIB given reports of coffee-ground emesis in nursing facility. However, pt with brown stools and no blood contents aspirated from PEG tube. PEG site clean, abdomen soft. Repeat CBC revealed stable Hb at 12.2. Pt at baseline without signs of active bleeding. Unclear if pt truly had coffee-ground emesis or vomited up possible digested food contents.   - Patient medically stable to be discharged back to NH facility. Continue home regimen of PPI  - resume tube feeds  - HbA1c 6.6% --> given comorbidities, recommend d/c glipizide to decrease risk of hypoglycemia. Cr at baseline can resume home regimen of metformin.  - perform med reconciliation  Spent 35 min coordinating discharge plan

## 2018-07-27 NOTE — PROGRESS NOTE ADULT - SUBJECTIVE AND OBJECTIVE BOX
Dominic Martinez, PGY1  Pager: [phone]  After 7: Night Float pager  Alternate contact:     Patient is a 73y old  Male who presents with a chief complaint of Sent in for coffee ground vomitting (2018 23:44)      SUBJECTIVE / OVERNIGHT EVENTS:  Pt was seen by bedside. He is nonverbal and Aox0. His body is constricted. He does not appear to be in pain or distress. Tried to reach wife, but number on file is not valid.     MEDICATIONS  (STANDING):  dextrose 5%. 1000 milliLiter(s) (50 mL/Hr) IV Continuous <Continuous>  dextrose 50% Injectable 12.5 Gram(s) IV Push once  dextrose 50% Injectable 25 Gram(s) IV Push once  dextrose 50% Injectable 25 Gram(s) IV Push once  insulin lispro (HumaLOG) corrective regimen sliding scale   SubCutaneous every 4 hours  pantoprazole  Injectable 40 milliGRAM(s) IV Push every 12 hours  sodium chloride 0.9%. 1000 milliLiter(s) (100 mL/Hr) IV Continuous <Continuous>    MEDICATIONS  (PRN):  dextrose 40% Gel 15 Gram(s) Oral once PRN Blood Glucose LESS THAN 70 milliGRAM(s)/deciliter  glucagon  Injectable 1 milliGRAM(s) IntraMuscular once PRN Glucose LESS THAN 70 milligrams/deciliter  ondansetron Injectable 4 milliGRAM(s) IV Push every 6 hours PRN Nausea and/or Vomiting      Vital Signs Last 24 Hrs  T(C): 36.8 (2018 06:06), Max: 37.6 (2018 13:21)  T(F): 98.2 (2018 06:06), Max: 99.6 (2018 13:21)  HR: 94 (2018 06:06) (94 - 115)  BP: 117/71 (2018 06:06) (112/74 - 136/85)  BP(mean): --  RR: 18 (2018 06:06) (16 - 18)  SpO2: 98% (2018 06:06) (98% - 100%)  CAPILLARY BLOOD GLUCOSE      POCT Blood Glucose.: 108 mg/dL (2018 06:32)  POCT Blood Glucose.: 111 mg/dL (2018 23:09)  POCT Blood Glucose.: 111 mg/dL (2018 19:52)  POCT Blood Glucose.: 160 mg/dL (2018 12:54)    I&O's Summary      PHYSICAL EXAM:  GENERAL: NAD, well-developed  EYES: EOMI, PERRLA, conjunctiva and sclera clear  NECK:  No JVD  CHEST/LUNG: CTABL ; No wheeze  HEART: RRR; No murmurs  ABDOMEN: Soft, Nontender, Nondistended; Bowel sounds present  : No Salazar  EXTREMITIES:  2+ Peripheral Pulses, No edema  PSYCH: AAOx3  NEUROLOGY: non-focal  SKIN: No rashes or lesions. No sacral ulcer    LABS:                        11.3   6.94  )-----------( 242      ( 2018 06:00 )             35.0     07-27    141  |  101  |  21  ----------------------------<  95  3.5   |  25  |  0.59    Ca    8.5      2018 06:00  Phos  4.3     07-  Mg     2.1     07-27    TPro  7.3  /  Alb  3.3  /  TBili  0.5  /  DBili  x   /  AST  32  /  ALT  19  /  AlkPhos  93  07-26    PT/INR - ( 2018 13:48 )   PT: 12.2 SEC;   INR: 1.06          PTT - ( 2018 13:48 )  PTT:25.2 SEC      Urinalysis Basic - ( 2018 14:55 )    Color: YELLOW / Appearance: HAZY / S.016 / pH: 8.0  Gluc: NEGATIVE / Ketone: NEGATIVE  / Bili: NEGATIVE / Urobili: NORMAL mg/dL   Blood: NEGATIVE / Protein: 10 mg/dL / Nitrite: NEGATIVE   Leuk Esterase: NEGATIVE / RBC: 5-10 / WBC 2-5   Sq Epi: x / Non Sq Epi: x / Bacteria: x        Microbiology;        RADIOLOGY & ADDITIONAL TESTS:    Imaging Personally Reviewed:          Consultant(s) Notes Reviewed:      Care Discussed with Consultants/Other Providers: Dominic Martinez, PGY1  Pager: [phone]  After 7: Night Float pager  Alternate contact:     Patient is a 73y old  Male who presents with a chief complaint of Sent in for coffee ground vomitting (2018 23:44)      SUBJECTIVE / OVERNIGHT EVENTS:  Pt was seen by bedside. He is nonverbal and Aox0. His posture is mildly constricted. He does not appear to be in pain or distress. Tried to reach wife, but number on file is not valid.     MEDICATIONS  (STANDING):  dextrose 5%. 1000 milliLiter(s) (50 mL/Hr) IV Continuous <Continuous>  dextrose 50% Injectable 12.5 Gram(s) IV Push once  dextrose 50% Injectable 25 Gram(s) IV Push once  dextrose 50% Injectable 25 Gram(s) IV Push once  insulin lispro (HumaLOG) corrective regimen sliding scale   SubCutaneous every 4 hours  pantoprazole  Injectable 40 milliGRAM(s) IV Push every 12 hours  sodium chloride 0.9%. 1000 milliLiter(s) (100 mL/Hr) IV Continuous <Continuous>    MEDICATIONS  (PRN):  dextrose 40% Gel 15 Gram(s) Oral once PRN Blood Glucose LESS THAN 70 milliGRAM(s)/deciliter  glucagon  Injectable 1 milliGRAM(s) IntraMuscular once PRN Glucose LESS THAN 70 milligrams/deciliter  ondansetron Injectable 4 milliGRAM(s) IV Push every 6 hours PRN Nausea and/or Vomiting      Vital Signs Last 24 Hrs  T(C): 36.8 (2018 06:06), Max: 37.6 (2018 13:21)  T(F): 98.2 (2018 06:06), Max: 99.6 (2018 13:21)  HR: 94 (2018 06:06) (94 - 115)  BP: 117/71 (2018 06:06) (112/74 - 136/85)  BP(mean): --  RR: 18 (2018 06:06) (16 - 18)  SpO2: 98% (2018 06:06) (98% - 100%)  CAPILLARY BLOOD GLUCOSE      POCT Blood Glucose.: 108 mg/dL (2018 06:32)  POCT Blood Glucose.: 111 mg/dL (2018 23:09)  POCT Blood Glucose.: 111 mg/dL (2018 19:52)  POCT Blood Glucose.: 160 mg/dL (2018 12:54)    I&O's Summary      PHYSICAL EXAM:  GENERAL: NAD, well-developed  EYES: EOMI, PERRLA, conjunctiva and sclera clear  NECK:  No JVD  CHEST/LUNG: CTABL ; No wheeze  HEART: RRR; No murmurs  ABDOMEN: Soft, Nontender, Nondistended; Bowel sounds present  : No Salazar  EXTREMITIES:  2+ Peripheral Pulses, No edema  PSYCH: AAOx3  NEUROLOGY: non-focal  SKIN: No rashes or lesions. No sacral ulcer    LABS:                        11.3   6.94  )-----------( 242      ( 2018 06:00 )             35.0     07-27    141  |  101  |  21  ----------------------------<  95  3.5   |  25  |  0.59    Ca    8.5      2018 06:00  Phos  4.3     07-  Mg     2.1     07-27    TPro  7.3  /  Alb  3.3  /  TBili  0.5  /  DBili  x   /  AST  32  /  ALT  19  /  AlkPhos  93  07-26    PT/INR - ( 2018 13:48 )   PT: 12.2 SEC;   INR: 1.06          PTT - ( 2018 13:48 )  PTT:25.2 SEC      Urinalysis Basic - ( 2018 14:55 )    Color: YELLOW / Appearance: HAZY / S.016 / pH: 8.0  Gluc: NEGATIVE / Ketone: NEGATIVE  / Bili: NEGATIVE / Urobili: NORMAL mg/dL   Blood: NEGATIVE / Protein: 10 mg/dL / Nitrite: NEGATIVE   Leuk Esterase: NEGATIVE / RBC: 5-10 / WBC 2-5   Sq Epi: x / Non Sq Epi: x / Bacteria: x        Microbiology;        RADIOLOGY & ADDITIONAL TESTS:    Imaging Personally Reviewed:  CXR:     FINDINGS:     The heart is normal in size. There are no focal pulmonary consolidations   or pneumothorax.    IMPRESSION:   No focal consolidation. Dominic Martinez, PGY1  Pager: [phone]  After 7: Night Float pager  Alternate contact:     Patient is a 73y old  Male who presents with a chief complaint of Sent in for coffee ground vomitting (2018 23:44)      SUBJECTIVE / OVERNIGHT EVENTS:  Pt was seen by bedside. He is nonverbal and Aox0. His posture is mildly constricted. He does not appear to be in pain or distress. Tried to reach wife, but number on file is not valid.     MEDICATIONS  (STANDING):  dextrose 5%. 1000 milliLiter(s) (50 mL/Hr) IV Continuous <Continuous>  dextrose 50% Injectable 12.5 Gram(s) IV Push once  dextrose 50% Injectable 25 Gram(s) IV Push once  dextrose 50% Injectable 25 Gram(s) IV Push once  insulin lispro (HumaLOG) corrective regimen sliding scale   SubCutaneous every 4 hours  pantoprazole  Injectable 40 milliGRAM(s) IV Push every 12 hours  sodium chloride 0.9%. 1000 milliLiter(s) (100 mL/Hr) IV Continuous <Continuous>    MEDICATIONS  (PRN):  dextrose 40% Gel 15 Gram(s) Oral once PRN Blood Glucose LESS THAN 70 milliGRAM(s)/deciliter  glucagon  Injectable 1 milliGRAM(s) IntraMuscular once PRN Glucose LESS THAN 70 milligrams/deciliter  ondansetron Injectable 4 milliGRAM(s) IV Push every 6 hours PRN Nausea and/or Vomiting      Vital Signs Last 24 Hrs  T(C): 36.8 (2018 06:06), Max: 37.6 (2018 13:21)  T(F): 98.2 (2018 06:06), Max: 99.6 (2018 13:21)  HR: 94 (2018 06:06) (94 - 115)  BP: 117/71 (2018 06:06) (112/74 - 136/85)  BP(mean): --  RR: 18 (2018 06:06) (16 - 18)  SpO2: 98% (2018 06:06) (98% - 100%)  CAPILLARY BLOOD GLUCOSE      POCT Blood Glucose.: 108 mg/dL (2018 06:32)  POCT Blood Glucose.: 111 mg/dL (2018 23:09)  POCT Blood Glucose.: 111 mg/dL (2018 19:52)  POCT Blood Glucose.: 160 mg/dL (2018 12:54)    I&O's Summary      PHYSICAL EXAM:  GENERAL: NAD, well-developed  EYES: EOMI, PERRLA, conjunctiva and sclera clear  NECK:  No JVD  CHEST/LUNG: CTABL ; No wheeze  HEART: RRR; No murmurs  ABDOMEN: Soft, Nontender, Nondistended; Bowel sounds present. PEG tube in place, clean, nonbloody, no erythema/swelling around it.  : No Salazar  EXTREMITIES:  2+ Peripheral Pulses, No edema  PSYCH: AAOx3  NEUROLOGY: non-focal  SKIN: No rashes or lesions. No sacral ulcer    LABS:                        11.3   6.94  )-----------( 242      ( 2018 06:00 )             35.0         141  |  101  |  21  ----------------------------<  95  3.5   |  25  |  0.59    Ca    8.5      2018 06:00  Phos  4.3       Mg     2.1         TPro  7.3  /  Alb  3.3  /  TBili  0.5  /  DBili  x   /  AST  32  /  ALT  19  /  AlkPhos  93  07-26    PT/INR - ( 2018 13:48 )   PT: 12.2 SEC;   INR: 1.06          PTT - ( 2018 13:48 )  PTT:25.2 SEC      Urinalysis Basic - ( 2018 14:55 )    Color: YELLOW / Appearance: HAZY / S.016 / pH: 8.0  Gluc: NEGATIVE / Ketone: NEGATIVE  / Bili: NEGATIVE / Urobili: NORMAL mg/dL   Blood: NEGATIVE / Protein: 10 mg/dL / Nitrite: NEGATIVE   Leuk Esterase: NEGATIVE / RBC: 5-10 / WBC 2-5   Sq Epi: x / Non Sq Epi: x / Bacteria: x        Microbiology;        RADIOLOGY & ADDITIONAL TESTS:    Imaging Personally Reviewed:  CXR:     FINDINGS:     The heart is normal in size. There are no focal pulmonary consolidations   or pneumothorax.    IMPRESSION:   No focal consolidation.

## 2018-07-27 NOTE — DISCHARGE NOTE ADULT - MEDICATION SUMMARY - MEDICATIONS TO TAKE
I will START or STAY ON the medications listed below when I get home from the hospital:    HumuLIN N 100 units/mL subcutaneous suspension  -- 16 unit(s) subcutaneous 2 times a day before meals  -- Indication: For Type 2 diabetes mellitus without complication, with long-term current use of insulin    Glucophage 850 mg oral tablet  -- 1 tab(s) by gastrostomy tube once a day (in the morning)  -- Indication: For Type 2 diabetes mellitus without complication, with long-term current use of insulin    atorvastatin 20 mg oral tablet  -- 1 tab(s) by gastrostomy tube once a day (at bedtime)  -- Indication: For Cholesterol    modafinil 200 mg oral tablet  -- 1 tab(s) by gastrostomy tube once a day (in the morning)  -- Indication: For Stimulant    docusate sodium 50 mg/15 mL oral syrup  -- 5 milliliter(s) by gastrostomy tube once a day  -- Indication: For Laxaive    pantoprazole 40 mg oral delayed release tablet  -- 1 tab(s) by gastrostomy tube once a day (before a meal)  -- Indication: For antacid

## 2018-07-27 NOTE — PROGRESS NOTE ADULT - PROBLEM SELECTOR PLAN 3
- Bed bound, contracted, and non verbal dating back to [facility] in 2014.  - No bed sores appreciated on physical exam today  - Frequent position changes to avoid decub

## 2018-07-27 NOTE — DISCHARGE NOTE ADULT - MEDICATION SUMMARY - MEDICATIONS TO STOP TAKING
I will STOP taking the medications listed below when I get home from the hospital:    glipiZIDE 5 mg oral tablet  -- 1 tab(s) by mouth once a day    aspirin 81 mg oral tablet, chewable  -- 1 tab(s) by mouth once a day    lisinopril 2.5 mg oral tablet  -- 1 tab(s) by mouth once a day

## 2018-11-06 NOTE — H&P ADULT - PROBLEM/PLAN-8
Pt refusing 1200 tube feeding because she is eating lunch tray.  Will continue to monitor.   DISPLAY PLAN FREE TEXT

## 2019-11-12 ENCOUNTER — INPATIENT (INPATIENT)
Facility: HOSPITAL | Age: 74
LOS: 5 days | Discharge: HOME | End: 2019-11-18
Attending: INTERNAL MEDICINE | Admitting: INTERNAL MEDICINE
Payer: MEDICARE

## 2019-11-12 VITALS
OXYGEN SATURATION: 97 % | TEMPERATURE: 98 F | RESPIRATION RATE: 18 BRPM | DIASTOLIC BLOOD PRESSURE: 71 MMHG | HEART RATE: 104 BPM | SYSTOLIC BLOOD PRESSURE: 108 MMHG

## 2019-11-12 DIAGNOSIS — R11.10 VOMITING, UNSPECIFIED: ICD-10-CM

## 2019-11-12 LAB
ALBUMIN SERPL ELPH-MCNC: 3.5 G/DL — SIGNIFICANT CHANGE UP (ref 3.3–5)
ALP SERPL-CCNC: 127 U/L — HIGH (ref 40–120)
ALT FLD-CCNC: 29 U/L — SIGNIFICANT CHANGE UP (ref 4–41)
ANION GAP SERPL CALC-SCNC: 17 MMO/L — HIGH (ref 7–14)
APTT BLD: 29.9 SEC — SIGNIFICANT CHANGE UP (ref 27.5–36.3)
AST SERPL-CCNC: 36 U/L — SIGNIFICANT CHANGE UP (ref 4–40)
BASE EXCESS BLDV CALC-SCNC: 1.3 MMOL/L — SIGNIFICANT CHANGE UP
BASE EXCESS BLDV CALC-SCNC: 1.4 MMOL/L — SIGNIFICANT CHANGE UP
BASE EXCESS BLDV CALC-SCNC: 6.9 MMOL/L — SIGNIFICANT CHANGE UP
BASOPHILS # BLD AUTO: 0.02 K/UL — SIGNIFICANT CHANGE UP (ref 0–0.2)
BASOPHILS NFR BLD AUTO: 0.2 % — SIGNIFICANT CHANGE UP (ref 0–2)
BILIRUB SERPL-MCNC: 0.7 MG/DL — SIGNIFICANT CHANGE UP (ref 0.2–1.2)
BLD GP AB SCN SERPL QL: NEGATIVE — SIGNIFICANT CHANGE UP
BLOOD GAS VENOUS - CREATININE: 0.7 MG/DL — SIGNIFICANT CHANGE UP (ref 0.5–1.3)
BLOOD GAS VENOUS - CREATININE: 0.77 MG/DL — SIGNIFICANT CHANGE UP (ref 0.5–1.3)
BLOOD GAS VENOUS - CREATININE: SIGNIFICANT CHANGE UP MG/DL (ref 0.5–1.3)
BLOOD GAS VENOUS - FIO2: 16 — SIGNIFICANT CHANGE UP
BLOOD GAS VENOUS - FIO2: 16 — SIGNIFICANT CHANGE UP
BLOOD GAS VENOUS - FIO2: 21 — SIGNIFICANT CHANGE UP
BUN SERPL-MCNC: 20 MG/DL — SIGNIFICANT CHANGE UP (ref 7–23)
CALCIUM SERPL-MCNC: 10 MG/DL — SIGNIFICANT CHANGE UP (ref 8.4–10.5)
CHLORIDE BLDV-SCNC: 105 MMOL/L — SIGNIFICANT CHANGE UP (ref 96–108)
CHLORIDE BLDV-SCNC: 109 MMOL/L — HIGH (ref 96–108)
CHLORIDE BLDV-SCNC: 99 MMOL/L — SIGNIFICANT CHANGE UP (ref 96–108)
CHLORIDE SERPL-SCNC: 94 MMOL/L — LOW (ref 98–107)
CO2 SERPL-SCNC: 26 MMOL/L — SIGNIFICANT CHANGE UP (ref 22–31)
CREAT SERPL-MCNC: 0.75 MG/DL — SIGNIFICANT CHANGE UP (ref 0.5–1.3)
EOSINOPHIL # BLD AUTO: 0.03 K/UL — SIGNIFICANT CHANGE UP (ref 0–0.5)
EOSINOPHIL NFR BLD AUTO: 0.3 % — SIGNIFICANT CHANGE UP (ref 0–6)
GAS PNL BLDV: 135 MMOL/L — LOW (ref 136–146)
GAS PNL BLDV: 136 MMOL/L — SIGNIFICANT CHANGE UP (ref 136–146)
GAS PNL BLDV: 137 MMOL/L — SIGNIFICANT CHANGE UP (ref 136–146)
GLUCOSE BLDC GLUCOMTR-MCNC: 155 MG/DL — HIGH (ref 70–99)
GLUCOSE BLDV-MCNC: 158 MG/DL — HIGH (ref 70–99)
GLUCOSE BLDV-MCNC: 177 MG/DL — HIGH (ref 70–99)
GLUCOSE BLDV-MCNC: 190 MG/DL — HIGH (ref 70–99)
GLUCOSE SERPL-MCNC: 185 MG/DL — HIGH (ref 70–99)
HCO3 BLDV-SCNC: 23 MMOL/L — SIGNIFICANT CHANGE UP (ref 20–27)
HCO3 BLDV-SCNC: 24 MMOL/L — SIGNIFICANT CHANGE UP (ref 20–27)
HCO3 BLDV-SCNC: 30 MMOL/L — HIGH (ref 20–27)
HCT VFR BLD CALC: 44.9 % — SIGNIFICANT CHANGE UP (ref 39–50)
HCT VFR BLDV CALC: 40 % — SIGNIFICANT CHANGE UP (ref 39–51)
HCT VFR BLDV CALC: 43.3 % — SIGNIFICANT CHANGE UP (ref 39–51)
HCT VFR BLDV CALC: 48 % — SIGNIFICANT CHANGE UP (ref 39–51)
HGB BLD-MCNC: 14.8 G/DL — SIGNIFICANT CHANGE UP (ref 13–17)
HGB BLDV-MCNC: 13 G/DL — SIGNIFICANT CHANGE UP (ref 13–17)
HGB BLDV-MCNC: 14.1 G/DL — SIGNIFICANT CHANGE UP (ref 13–17)
HGB BLDV-MCNC: 15.7 G/DL — SIGNIFICANT CHANGE UP (ref 13–17)
IMM GRANULOCYTES NFR BLD AUTO: 0.2 % — SIGNIFICANT CHANGE UP (ref 0–1.5)
INR BLD: 1.08 — SIGNIFICANT CHANGE UP (ref 0.88–1.17)
LACTATE BLDV-MCNC: 3.1 MMOL/L — HIGH (ref 0.5–2)
LACTATE BLDV-MCNC: 4.2 MMOL/L — CRITICAL HIGH (ref 0.5–2)
LACTATE BLDV-MCNC: 5.6 MMOL/L — CRITICAL HIGH (ref 0.5–2)
LIDOCAIN IGE QN: 35.5 U/L — SIGNIFICANT CHANGE UP (ref 7–60)
LYMPHOCYTES # BLD AUTO: 2.09 K/UL — SIGNIFICANT CHANGE UP (ref 1–3.3)
LYMPHOCYTES # BLD AUTO: 21.8 % — SIGNIFICANT CHANGE UP (ref 13–44)
MAGNESIUM SERPL-MCNC: 1.9 MG/DL — SIGNIFICANT CHANGE UP (ref 1.6–2.6)
MCHC RBC-ENTMCNC: 31.2 PG — SIGNIFICANT CHANGE UP (ref 27–34)
MCHC RBC-ENTMCNC: 33 % — SIGNIFICANT CHANGE UP (ref 32–36)
MCV RBC AUTO: 94.7 FL — SIGNIFICANT CHANGE UP (ref 80–100)
MONOCYTES # BLD AUTO: 0.67 K/UL — SIGNIFICANT CHANGE UP (ref 0–0.9)
MONOCYTES NFR BLD AUTO: 7 % — SIGNIFICANT CHANGE UP (ref 2–14)
NEUTROPHILS # BLD AUTO: 6.77 K/UL — SIGNIFICANT CHANGE UP (ref 1.8–7.4)
NEUTROPHILS NFR BLD AUTO: 70.5 % — SIGNIFICANT CHANGE UP (ref 43–77)
NRBC # FLD: 0 K/UL — SIGNIFICANT CHANGE UP (ref 0–0)
PCO2 BLDV: 46 MMHG — SIGNIFICANT CHANGE UP (ref 41–51)
PCO2 BLDV: 52 MMHG — HIGH (ref 41–51)
PCO2 BLDV: 53 MMHG — HIGH (ref 41–51)
PH BLDV: 7.32 PH — SIGNIFICANT CHANGE UP (ref 7.32–7.43)
PH BLDV: 7.33 PH — SIGNIFICANT CHANGE UP (ref 7.32–7.43)
PH BLDV: 7.45 PH — HIGH (ref 7.32–7.43)
PHOSPHATE SERPL-MCNC: 4.6 MG/DL — HIGH (ref 2.5–4.5)
PLATELET # BLD AUTO: 303 K/UL — SIGNIFICANT CHANGE UP (ref 150–400)
PMV BLD: 11 FL — SIGNIFICANT CHANGE UP (ref 7–13)
PO2 BLDV: 30 MMHG — LOW (ref 35–40)
PO2 BLDV: 39 MMHG — SIGNIFICANT CHANGE UP (ref 35–40)
PO2 BLDV: < 24 MMHG — LOW (ref 35–40)
POTASSIUM BLDV-SCNC: 3.9 MMOL/L — SIGNIFICANT CHANGE UP (ref 3.4–4.5)
POTASSIUM BLDV-SCNC: 4.1 MMOL/L — SIGNIFICANT CHANGE UP (ref 3.4–4.5)
POTASSIUM BLDV-SCNC: 4.6 MMOL/L — HIGH (ref 3.4–4.5)
POTASSIUM SERPL-MCNC: 4.9 MMOL/L — SIGNIFICANT CHANGE UP (ref 3.5–5.3)
POTASSIUM SERPL-SCNC: 4.9 MMOL/L — SIGNIFICANT CHANGE UP (ref 3.5–5.3)
PROT SERPL-MCNC: 8 G/DL — SIGNIFICANT CHANGE UP (ref 6–8.3)
PROTHROM AB SERPL-ACNC: 12.3 SEC — SIGNIFICANT CHANGE UP (ref 9.8–13.1)
RBC # BLD: 4.74 M/UL — SIGNIFICANT CHANGE UP (ref 4.2–5.8)
RBC # FLD: 13.8 % — SIGNIFICANT CHANGE UP (ref 10.3–14.5)
RH IG SCN BLD-IMP: POSITIVE — SIGNIFICANT CHANGE UP
SAO2 % BLDV: 29.2 % — LOW (ref 60–85)
SAO2 % BLDV: 45.1 % — LOW (ref 60–85)
SAO2 % BLDV: 72.9 % — SIGNIFICANT CHANGE UP (ref 60–85)
SODIUM SERPL-SCNC: 137 MMOL/L — SIGNIFICANT CHANGE UP (ref 135–145)
WBC # BLD: 9.6 K/UL — SIGNIFICANT CHANGE UP (ref 3.8–10.5)
WBC # FLD AUTO: 9.6 K/UL — SIGNIFICANT CHANGE UP (ref 3.8–10.5)

## 2019-11-12 PROCEDURE — 74018 RADEX ABDOMEN 1 VIEW: CPT | Mod: 26

## 2019-11-12 PROCEDURE — 70450 CT HEAD/BRAIN W/O DYE: CPT | Mod: 26

## 2019-11-12 PROCEDURE — 71045 X-RAY EXAM CHEST 1 VIEW: CPT | Mod: 26

## 2019-11-12 PROCEDURE — 74177 CT ABD & PELVIS W/CONTRAST: CPT | Mod: 26

## 2019-11-12 RX ORDER — INSULIN LISPRO 100/ML
VIAL (ML) SUBCUTANEOUS AT BEDTIME
Refills: 0 | Status: DISCONTINUED | OUTPATIENT
Start: 2019-11-12 | End: 2019-11-12

## 2019-11-12 RX ORDER — SODIUM CHLORIDE 9 MG/ML
1000 INJECTION, SOLUTION INTRAVENOUS
Refills: 0 | Status: DISCONTINUED | OUTPATIENT
Start: 2019-11-12 | End: 2019-11-18

## 2019-11-12 RX ORDER — DEXTROSE 50 % IN WATER 50 %
25 SYRINGE (ML) INTRAVENOUS ONCE
Refills: 0 | Status: DISCONTINUED | OUTPATIENT
Start: 2019-11-12 | End: 2019-11-18

## 2019-11-12 RX ORDER — SODIUM CHLORIDE 9 MG/ML
500 INJECTION, SOLUTION INTRAVENOUS ONCE
Refills: 0 | Status: COMPLETED | OUTPATIENT
Start: 2019-11-12 | End: 2019-11-12

## 2019-11-12 RX ORDER — SODIUM CHLORIDE 9 MG/ML
1000 INJECTION INTRAMUSCULAR; INTRAVENOUS; SUBCUTANEOUS ONCE
Refills: 0 | Status: COMPLETED | OUTPATIENT
Start: 2019-11-12 | End: 2019-11-12

## 2019-11-12 RX ORDER — INSULIN LISPRO 100/ML
VIAL (ML) SUBCUTANEOUS
Refills: 0 | Status: DISCONTINUED | OUTPATIENT
Start: 2019-11-12 | End: 2019-11-12

## 2019-11-12 RX ORDER — GLUCAGON INJECTION, SOLUTION 0.5 MG/.1ML
1 INJECTION, SOLUTION SUBCUTANEOUS ONCE
Refills: 0 | Status: DISCONTINUED | OUTPATIENT
Start: 2019-11-12 | End: 2019-11-18

## 2019-11-12 RX ORDER — INSULIN LISPRO 100/ML
VIAL (ML) SUBCUTANEOUS EVERY 6 HOURS
Refills: 0 | Status: DISCONTINUED | OUTPATIENT
Start: 2019-11-12 | End: 2019-11-18

## 2019-11-12 RX ORDER — ACETAMINOPHEN 500 MG
1000 TABLET ORAL ONCE
Refills: 0 | Status: COMPLETED | OUTPATIENT
Start: 2019-11-12 | End: 2019-11-12

## 2019-11-12 RX ORDER — DEXTROSE 50 % IN WATER 50 %
12.5 SYRINGE (ML) INTRAVENOUS ONCE
Refills: 0 | Status: DISCONTINUED | OUTPATIENT
Start: 2019-11-12 | End: 2019-11-18

## 2019-11-12 RX ORDER — DEXTROSE 50 % IN WATER 50 %
15 SYRINGE (ML) INTRAVENOUS ONCE
Refills: 0 | Status: DISCONTINUED | OUTPATIENT
Start: 2019-11-12 | End: 2019-11-18

## 2019-11-12 RX ORDER — ONDANSETRON 8 MG/1
4 TABLET, FILM COATED ORAL ONCE
Refills: 0 | Status: COMPLETED | OUTPATIENT
Start: 2019-11-12 | End: 2019-11-12

## 2019-11-12 RX ADMIN — Medication 1000 MILLIGRAM(S): at 23:26

## 2019-11-12 RX ADMIN — SODIUM CHLORIDE 1000 MILLILITER(S): 9 INJECTION INTRAMUSCULAR; INTRAVENOUS; SUBCUTANEOUS at 10:26

## 2019-11-12 RX ADMIN — ONDANSETRON 4 MILLIGRAM(S): 8 TABLET, FILM COATED ORAL at 10:26

## 2019-11-12 RX ADMIN — SODIUM CHLORIDE 1000 MILLILITER(S): 9 INJECTION INTRAMUSCULAR; INTRAVENOUS; SUBCUTANEOUS at 09:09

## 2019-11-12 RX ADMIN — Medication 400 MILLIGRAM(S): at 22:45

## 2019-11-12 RX ADMIN — SODIUM CHLORIDE 500 MILLILITER(S): 9 INJECTION, SOLUTION INTRAVENOUS at 12:45

## 2019-11-12 RX ADMIN — SODIUM CHLORIDE 1000 MILLILITER(S): 9 INJECTION INTRAMUSCULAR; INTRAVENOUS; SUBCUTANEOUS at 08:37

## 2019-11-12 NOTE — CONSULT NOTE ADULT - ASSESSMENT
74M PMH SAH now non-verbal, bed bound, dysphagia s/p PEG p/w emesis, concerning for SBO.  - Follow up imaging  - G tube to gravity  - Seen and examined with Dr. Ramirez    [id number]

## 2019-11-12 NOTE — ED ADULT NURSE NOTE - OBJECTIVE STATEMENT
The patient is a 74y Male complaining of pt nonverbal sent in from nursing home for r/o gi bleed s/p vomit coffe ground emesis in AM

## 2019-11-12 NOTE — ED ADULT TRIAGE NOTE - CHIEF COMPLAINT QUOTE
Pt bib seniorcare EMs from [facility] for vomiting coffee ground emesis x2 days.  Pt w/ G-tube feedings, per NH staff "was stopped since yesterday".  Pt w/ hx CVA, Dysphagia, HLD HTN, CAD w/ stent (no anticoagulants noted), subarachnoid hemorrhage.  Pt is awake, alert but not responding, nonverbal, unclear if this is normal mental baseline. VSS in triage.  just PTA at nursing home.

## 2019-11-12 NOTE — ED ADULT NURSE NOTE - CHIEF COMPLAINT QUOTE
Pt bib senior[facility] EMs from [facility] for vomiting coffee ground emesis x2 days.  Pt w/ G-tube feedings, per NH staff "was stopped since yesterday".  Pt w/ hx CVA, Dysphagia, HLD HTN, CAD w/ stent (no anticoagulants noted), subarachnoid hemorrhage.  Pt is awake, alert but not responding, nonverbal, unclear if this is normal mental baseline. VSS in triage.  just PTA at nursing home.

## 2019-11-12 NOTE — ED PROVIDER NOTE - PROGRESS NOTE DETAILS
Sahil PGY4: CT without obstruction but with noted esophgeal thickening. Will admit to medicine and make npo per dr. salcedo request

## 2019-11-12 NOTE — H&P ADULT - NSICDXFAMILYHX_GEN_ALL_CORE_FT
FAMILY HISTORY:  Cerebrovascular accident (CVA), Father 82 yo  Family history of liver disease, Mother  from liver disease 42 yo

## 2019-11-12 NOTE — H&P ADULT - ASSESSMENT
74M PMH SAH now non-verbal, bed bound, dysphagia s/p PEG p/w multiple episodes of emesis overnight. Per staff pt has been having nightly emesis for the past month, dietician has been adjusting his tube feed rates. Now takes glucerna 40cc/hr with 50cc flushes. Report from nursing facility states that pt had coffee ground emesis, nurse at facility is unsure if coffee ground emesis was present. 74M PMH SAH now non-verbal, bed bound, dysphagia s/p PEG p/w multiple episodes of emesis overnight. Per staff pt has been having nightly emesis for the past month, dietician has been adjusting his tube feed rates. Now takes glucerna 40cc/hr with 50cc flushes. Report from nursing facility states that pt had coffee ground emesis, nurse at facility is unsure if coffee ground emesis was present.  assessment and plan  #GI/vomiting rule out obstruction abdomen distended discussed with the ER attending to do flat abdomen and CT scan will keep him nothing  off feeding today.  Will restart feeding/free water in a.m.if remains stable  #Diabetes will hold off insulin start fingerstick coverage 24 hours  #s/p SAH  continue modafinil 10  mg

## 2019-11-12 NOTE — CONSULT NOTE ADULT - ASSESSMENT
EKG not in chart     Echo < from: Transthoracic Echocardiogram (05.10.17 @ 14:35) >  1. Calcified trileaflet aortic valve with normal opening.  Minimal aortic regurgitation.  2. Normal left ventricular systolic function. No segmental  wall motion abnormalities.  3. Normal right ventricular sizeand function.    < end of copied text >      1) Vomiting  - No Bowl obstruction on CT,  t/t per medicine     2) Chronic CVA - CT head shows ? NPH old strokes no ASA sec to h/o SAH     3) h/o CAD - no ASA sec to h/o SAH and GI bleed

## 2019-11-12 NOTE — H&P ADULT - HISTORY OF PRESENT ILLNESS
Chief Complaint: Vomiting, r/o GI Bleed.    · Chief Complaint: The patient is a 74y Male complaining of	  · HPI Objective Statement: 74M PMH SAH now non-verbal, bed bound, dysphagia s/p PEG p/w multiple episodes of emesis overnight. Per staff pt has been having nightly emesis for the past month, dietician has been adjusting his tube feed rates. Now takes glucerna 40cc/hr with 50cc flushes. Report from nursing facility states that pt had coffee ground emesis, nurse at facility is unsure if coffee ground emesis was present. Denies abnormalities in vital signs. Pt is nonverbal and is unable to particiapte in ROS. Chief Complaint: Vomiting, r/o GI Bleed.    · Chief Complaint: The patient is a 74y Male complaining of	  · HPI Objective Statement: 74M PMH SAH now non-verbal, bed bound, dysphagia s/p PEG p/w multiple episodes of emesis overnight. Per staff pt has been having nightly emesis for the past month, dietician has been adjusting his tube feed rates. Now takes glucerna 40cc/hr with 50cc flushes. Report from nursing facility states that pt had coffee ground emesis, nurse at facility is unsure if coffee ground emesis was present.

## 2019-11-12 NOTE — CONSULT NOTE ADULT - SUBJECTIVE AND OBJECTIVE BOX
General Surgery Consult  Consulting surgical team: Medicine  Consulting attending: James     74M PMH SAH now non-verbal, bed bound, dysphagia s/p PEG p/w multiple episodes of emesis overnight. Per staff pt has been having nightly emesis for the past month, dietician has been adjusting his tube feed rates. Now takes glucerna 40cc/hr with 50cc flushes. Report from nursing facility states that pt had coffee ground emesis, nurse at facility is unsure if coffee ground emesis was present. Denies abnormalities in vital signs. Pt is nonverbal and is unable to particiapte in ROS. (12 Nov 2019 10:24)      PAST MEDICAL HISTORY:  Arthritis  Hemorrhoids, unspecified hemorrhoid type  SAH (subarachnoid hemorrhage)  BPH (benign prostatic hypertrophy)  Gastritis  Dyslipidemia  HTN (Hypertension), Benign  Diabetes  CAD (Coronary Artery Disease)      PAST SURGICAL HISTORY:  Stented coronary artery  Appendicitis      MEDICATIONS:      ALLERGIES:  No Known Allergies      VITALS & I/Os:  Vital Signs Last 24 Hrs  T(C): 36.7 (12 Nov 2019 12:04), Max: 36.7 (12 Nov 2019 12:04)  T(F): 98.1 (12 Nov 2019 12:04), Max: 98.1 (12 Nov 2019 12:04)  HR: 97 (12 Nov 2019 12:04) (97 - 105)  BP: 128/73 (12 Nov 2019 12:04) (108/71 - 129/75)  BP(mean): --  RR: 18 (12 Nov 2019 12:04) (18 - 20)  SpO2: 97% (12 Nov 2019 12:04) (96% - 97%)    I&O's Summary      PHYSICAL EXAM:  General: No acute distress, does not respond to questioning  Respiratory: Nonlabored  Cardiovascular: RRR  Abdominal: Softly distended, G tube clamped. does not appear tender  Extremities: Warm    LABS:                        14.8   9.60  )-----------( 303      ( 12 Nov 2019 07:55 )             44.9     11-12    137  |  94<L>  |  20  ----------------------------<  185<H>  4.9   |  26  |  0.75    Ca    10.0      12 Nov 2019 07:55  Phos  4.6     11-12  Mg     1.9     11-12    TPro  8.0  /  Alb  3.5  /  TBili  0.7  /  DBili  x   /  AST  36  /  ALT  29  /  AlkPhos  127<H>  11-12    Lactate:  11-12 @ 10:26  4.2  11-12 @ 09:10  5.6  11-12 @ 08:00  3.1    PT/INR - ( 12 Nov 2019 08:00 )   PT: 12.3 SEC;   INR: 1.08          PTT - ( 12 Nov 2019 08:00 )  PTT:29.9 SEC              IMAGING: Pending

## 2019-11-12 NOTE — CONSULT NOTE ADULT - SUBJECTIVE AND OBJECTIVE BOX
Olu Anthony MD  Interventional Cardiology / Endovascular Specialist  [place] Office : [address] [place] [address] [place] [address]  Tel:   [place] Office : [address] [facility] [place] [address]  Tel: [phone]  Cell : [phone]      HISTORY OF PRESENTING ILLNESS:    74M PMH SAH now non-verbal, bed bound, dysphagia s/p PEG p/w multiple episodes of emesis overnight. Per staff pt has been having nightly emesis for the past month, dietician has been adjusting his tube feed rates. Now takes glucerna 40cc/hr with 50cc flushes. Report from nursing facility states that pt had coffee ground emesis, nurse at facility is unsure if coffee ground emesis was present    PAST MEDICAL & SURGICAL HISTORY:  Arthritis  Hemorrhoids, unspecified hemorrhoid type  SAH (subarachnoid hemorrhage)  BPH (benign prostatic hypertrophy)  Gastritis  Dyslipidemia  HTN (Hypertension), Benign  Diabetes  CAD (Coronary Artery Disease)  Stented coronary artery  Appendicitis      SOCIAL HISTORY: Substance Use (street drugs): ( x ) never used  (  ) other:    FAMILY HISTORY:  Family history of liver disease: Mother  from liver disease 42 yo  Cerebrovascular accident (CVA): Father 80 yo      REVIEW OF SYSTEMS:  unable to obtain     MEDICATIONS:  acetaminophen  IVPB .. 1000 milliGRAM(s) IV Intermittent once            FAMILY HISTORY:  Family history of liver disease: Mother  from liver disease 42 yo  Cerebrovascular accident (CVA): Father 80 yo        Allergies    No Known Allergies    Intolerances    	      PHYSICAL EXAM:  T(C): 36.7 (19 @ 12:04), Max: 36.7 (19 @ 12:04)  HR: 86 (19 @ 15:22) (86 - 105)  BP: 137/85 (19 @ 15:22) (108/71 - 137/85)  RR: 18 (19 @ 15:22) (18 - 20)  SpO2: 100% (19 @ 15:22) (96% - 100%)  Wt(kg): --  I&O's Summary      GENERAL: NAD, nonverbal   ENMT: No tonsillar erythema, exudates, or enlargement; Moist mucous membranes  Cardiovascular: Normal S1 S2, No JVD, No murmurs, No edema  Respiratory: decreased b/s B/L   Gastrointestinal:  Soft, Non-tender, + BS	, peg +   Extremities:  No clubbing, cyanosis or edema  LYMPH: No lymphadenopathy noted        LABS:	 	    CARDIAC MARKERS:                                  14.8   9.60  )-----------( 303      ( 2019 07:55 )             44.9     11-12    137  |  94<L>  |  20  ----------------------------<  185<H>  4.9   |  26  |  0.75    Ca    10.0      2019 07:55  Phos  4.6     11-12  Mg     1.9     -    TPro  8.0  /  Alb  3.5  /  TBili  0.7  /  DBili  x   /  AST  36  /  ALT  29  /  AlkPhos  127<H>  -12    proBNP:   Lipid Profile:   HgA1c:   TSH:     Consultant(s) Notes Reviewed:  [x ] YES  [ ] NO    Care Discussed with Consultants/Other Providers [ x] YES  [ ] NO    Imaging Personally Reviewed independently:  [x] YES  [ ] NO    All labs, radiologic studies, vitals, orders and medications list reviewed. Patient is seen and examined at bedside. Case discussed with medical team.    ASSESSMENT/PLAN:

## 2019-11-12 NOTE — ED PROCEDURE NOTE - PROCEDURE ADDITIONAL DETAILS
Emergency Department Focused Ultrasound performed at patient's bedside for educational purposes.     The study will have a follow up study performed or was performed in the direct supervision of an ultrasound trained attending.

## 2019-11-12 NOTE — ED PROVIDER NOTE - OBJECTIVE STATEMENT
74M PMH SAH now non-verbal, bed bound, dysphagia s/p PEG p/w multiple episodes of emesis overnight. Per staff pt has been having nightly emesis for the past month, dietician has been adjusting his tube feed rates. Now takes glucerna 40cc/hr with 50cc flushes. Report from nursing facility states that pt had coffee ground emesis, nurse at facility is unsure if coffee ground emesis was present. Denies abnormalities in vital signs. Pt is nonverbal and is unable to particiapte in ROS.     D/w Anu JIMENEZ at [facility]

## 2019-11-12 NOTE — ED ADULT NURSE NOTE - NSIMPLEMENTINTERV_GEN_ALL_ED
Implemented All Fall with Harm Risk Interventions:  [place] to call system. Call bell, personal items and telephone within reach. Instruct patient to call for assistance. Room bathroom lighting operational. Non-slip footwear when patient is off stretcher. Physically safe environment: no spills, clutter or unnecessary equipment. Stretcher in lowest position, wheels locked, appropriate side rails in place. Provide visual cue, wrist band, yellow gown, etc. Monitor gait and stability. Monitor for mental status changes and reorient to person, place, and time. Review medications for side effects contributing to fall risk. Reinforce activity limits and safety measures with patient and family. Provide visual clues: red socks.

## 2019-11-12 NOTE — ED PROVIDER NOTE - CLINICAL SUMMARY MEDICAL DECISION MAKING FREE TEXT BOX
74M PMH SAH now non-verbal, bed bound, dysphagia s/p PEG p/w multiple episodes of emesis overnight; concern for obstruciton, will check CT, labs, and admit

## 2019-11-12 NOTE — H&P ADULT - NSICDXPASTMEDICALHX_GEN_ALL_CORE_FT
PAST MEDICAL HISTORY:  Arthritis     BPH (benign prostatic hypertrophy)     CAD (Coronary Artery Disease)     Diabetes     Dyslipidemia     Gastritis     Hemorrhoids, unspecified hemorrhoid type     HTN (Hypertension), Benign     SAH (subarachnoid hemorrhage)

## 2019-11-12 NOTE — ED PROVIDER NOTE - ATTENDING CONTRIBUTION TO CARE
GEN: no acute respiratory distress. nontoxic, non-verbal  HEENT: NCAT. face symmetrical. PERRL 3mm, EOMI  Neck: no JVD, trachea midline, no LAD  CV: mildly tachycardic RR. +S1S2, no murmur. 2+ pulses in 4 extremities, cap refill <2 sec  Chest: CTA B/l. no wheezing, rales, rhonchi.   ABD: +BS, soft, non distended, non tender. No guarding/rebound. g tube in LUQ  MSK: No clubbing, cyanosis, edema. extremities contracted, warm and well perfused  Neuro: non verbal not following commands, awake.  SKIN: No erythema, rash    75 yo M pmh SAH, BPH, DM , DLD, HTN, CAD, g tube with multiple episode of vomiting, ?coffee ground emesis. abd soft non-tender. Ddx includes, but not limited to, sbo, enterocolitis. plan: labs, ct, reassess

## 2019-11-12 NOTE — H&P ADULT - NSHPLABSRESULTS_GEN_ALL_CORE
.  Labs reviewed personally.                          14.8   9.60  )-----------( 303      ( 12 Nov 2019 07:55 )             44.9     Hgb Trend: 14.8<--  11-12    137  |  94<L>  |  20  ----------------------------<  185<H>  4.9   |  26  |  0.75    Ca    10.0      12 Nov 2019 07:55  Phos  4.6     11-12  Mg     1.9     11-12    TPro  8.0  /  Alb  3.5  /  TBili  0.7  /  DBili  x   /  AST  36  /  ALT  29  /  AlkPhos  127<H>  11-12    Creatinine Trend: 0.75<--  PT/INR - ( 12 Nov 2019 08:00 )   PT: 12.3 SEC;   INR: 1.08          PTT - ( 12 Nov 2019 08:00 )  PTT:29.9 SEC            Imaging reviewed personally:      EKG reviewed  :· EKG Date/Time: 12-Nov-2019 07:30  · Rate: 103  · Interpretation: abnormal  · Abnormal Rhythm: sinus tachycardia  · Acute or Evolving MI?: no  · DC: 146  · QRS: 72  · ST/T Wave: no ischemic changes  · Prior EKG Status: the EKG is unchanged from prior EKG

## 2019-11-12 NOTE — H&P ADULT - NSHPPHYSICALEXAM_GEN_ALL_CORE
· Physical Examination: Gen: NAD, nonverbal  	Head: NCAT  	HEENT: PERRL, oral mucosa moist, normal conjunctiva, oropharynx clear without exudate or erythema  	Lung: CTAB, no respiratory distress, no wheezing, rales, rhonchi  	CV: normal s1/s2, regular rate, normal rhythm, no murmurs, Normal perfusion, pulses 2+ throughout  	Abd: soft, NT, distended, +PEG, skin site CDI  	MSK: upper and lower extremity contractures, does not follow commands  	Neuro: unable to assess 2/2 non-verbal, does not follow commands  Skin: No rash

## 2019-11-12 NOTE — ED PROVIDER NOTE - PHYSICAL EXAMINATION
Gen: NAD, nonverbal  Head: NCAT  HEENT: PERRL, oral mucosa moist, normal conjunctiva, oropharynx clear without exudate or erythema  Lung: CTAB, no respiratory distress, no wheezing, rales, rhonchi  CV: normal s1/s2, regular rate, normal rhythm, no murmurs, Normal perfusion, pulses 2+ throughout  Abd: soft, NT, distended, +PEG, skin site CDI  MSK: upper and lower extremity contractures, does not follow commands  Neuro: unable to assess 2/2 non-verbal, does not follow commands  Skin: No rash

## 2019-11-13 LAB
ANION GAP SERPL CALC-SCNC: 16 MMO/L — HIGH (ref 7–14)
BUN SERPL-MCNC: 19 MG/DL — SIGNIFICANT CHANGE UP (ref 7–23)
CALCIUM SERPL-MCNC: 9.3 MG/DL — SIGNIFICANT CHANGE UP (ref 8.4–10.5)
CHLORIDE SERPL-SCNC: 102 MMOL/L — SIGNIFICANT CHANGE UP (ref 98–107)
CO2 SERPL-SCNC: 25 MMOL/L — SIGNIFICANT CHANGE UP (ref 22–31)
CREAT SERPL-MCNC: 0.76 MG/DL — SIGNIFICANT CHANGE UP (ref 0.5–1.3)
GLUCOSE BLDC GLUCOMTR-MCNC: 117 MG/DL — HIGH (ref 70–99)
GLUCOSE BLDC GLUCOMTR-MCNC: 118 MG/DL — HIGH (ref 70–99)
GLUCOSE BLDC GLUCOMTR-MCNC: 122 MG/DL — HIGH (ref 70–99)
GLUCOSE BLDC GLUCOMTR-MCNC: 129 MG/DL — HIGH (ref 70–99)
GLUCOSE BLDC GLUCOMTR-MCNC: 130 MG/DL — HIGH (ref 70–99)
GLUCOSE BLDC GLUCOMTR-MCNC: 137 MG/DL — HIGH (ref 70–99)
GLUCOSE SERPL-MCNC: 124 MG/DL — HIGH (ref 70–99)
HCT VFR BLD CALC: 38.2 % — LOW (ref 39–50)
HCV AB S/CO SERPL IA: 0.15 S/CO — SIGNIFICANT CHANGE UP (ref 0–0.99)
HCV AB SERPL-IMP: SIGNIFICANT CHANGE UP
HGB BLD-MCNC: 12.5 G/DL — LOW (ref 13–17)
MCHC RBC-ENTMCNC: 31.4 PG — SIGNIFICANT CHANGE UP (ref 27–34)
MCHC RBC-ENTMCNC: 32.7 % — SIGNIFICANT CHANGE UP (ref 32–36)
MCV RBC AUTO: 96 FL — SIGNIFICANT CHANGE UP (ref 80–100)
NRBC # FLD: 0 K/UL — SIGNIFICANT CHANGE UP (ref 0–0)
PLATELET # BLD AUTO: 266 K/UL — SIGNIFICANT CHANGE UP (ref 150–400)
PMV BLD: 11.3 FL — SIGNIFICANT CHANGE UP (ref 7–13)
POTASSIUM SERPL-MCNC: 3.8 MMOL/L — SIGNIFICANT CHANGE UP (ref 3.5–5.3)
POTASSIUM SERPL-SCNC: 3.8 MMOL/L — SIGNIFICANT CHANGE UP (ref 3.5–5.3)
RBC # BLD: 3.98 M/UL — LOW (ref 4.2–5.8)
RBC # FLD: 14.1 % — SIGNIFICANT CHANGE UP (ref 10.3–14.5)
SODIUM SERPL-SCNC: 143 MMOL/L — SIGNIFICANT CHANGE UP (ref 135–145)
WBC # BLD: 7.27 K/UL — SIGNIFICANT CHANGE UP (ref 3.8–10.5)
WBC # FLD AUTO: 7.27 K/UL — SIGNIFICANT CHANGE UP (ref 3.8–10.5)

## 2019-11-13 RX ORDER — MODAFINIL 200 MG/1
1 TABLET ORAL
Qty: 0 | Refills: 0 | DISCHARGE

## 2019-11-13 RX ORDER — DOCUSATE SODIUM 100 MG
15 CAPSULE ORAL
Qty: 0 | Refills: 0 | DISCHARGE

## 2019-11-13 RX ORDER — DOCUSATE SODIUM 100 MG
5 CAPSULE ORAL
Qty: 0 | Refills: 0 | DISCHARGE

## 2019-11-13 RX ORDER — HUMAN INSULIN 100 [IU]/ML
16 INJECTION, SUSPENSION SUBCUTANEOUS
Qty: 0 | Refills: 0 | DISCHARGE

## 2019-11-13 NOTE — PROGRESS NOTE ADULT - SUBJECTIVE AND OBJECTIVE BOX
CHIEF COMPLAINT:    SUBJECTIVE:     REVIEW OF SYSTEMS:    CONSTITUTIONAL: (  )  weakness,  (  ) fevers or chills  EYES/ENT: (  )visual changes;     NECK: (  ) pain or stiffness  RESPIRATORY:   (  )cough, wheezing, hemoptysis;  (  ) shortness of breath  CARDIOVASCULAR:  (  )chest pain or palpitations  GASTROINTESTINAL:   (  )abdominal or epigastric pain.  (  ) nausea, vomiting, or hematemesis;   (   ) diarrhea or constipation.   GENITOURINARY:   (    ) dysuria, frequency or hematuria  NEUROLOGICAL:  (   ) numbness or weakness   All other review of systems is negative unless indicated above    Vital Signs Last 24 Hrs  T(C): 36.3 (13 Nov 2019 06:58), Max: 37.2 (12 Nov 2019 21:45)  T(F): 97.4 (13 Nov 2019 06:58), Max: 99 (12 Nov 2019 21:45)  HR: 83 (13 Nov 2019 06:58) (83 - 97)  BP: 127/81 (13 Nov 2019 06:58) (127/81 - 142/72)  BP(mean): --  RR: 18 (13 Nov 2019 06:58) (16 - 18)  SpO2: 97% (13 Nov 2019 06:58) (97% - 100%)    I&O's Summary    12 Nov 2019 07:01  -  13 Nov 2019 07:00  --------------------------------------------------------  IN: 0 mL / OUT: 320 mL / NET: -320 mL        CAPILLARY BLOOD GLUCOSE      POCT Blood Glucose.: 118 mg/dL (13 Nov 2019 08:37)  POCT Blood Glucose.: 117 mg/dL (13 Nov 2019 06:38)  POCT Blood Glucose.: 130 mg/dL (13 Nov 2019 00:21)  POCT Blood Glucose.: 155 mg/dL (12 Nov 2019 21:41)      PHYSICAL EXAM:    Constitutional:  (   ) NAD,   (   )awake and alert  HEENT: PERR, EOMI,    Neck: Soft and supple, No LAD, No JVD  Respiratory:  (    Breath sounds are clear bilaterally,    (   ) wheezing, rales or rhonchi  Cardiovascular:     (   )S1 and S2, regular rate and rhythm, no Murmurs, gallops or rubs  Gastrointestinal:  (   )Bowel Sounds present, soft,   (  )nontender, nondistended,    Extremities:    (  ) peripheral edema  Vascular: 2+ peripheral pulses  Neurological:    (    )A/O x 3,   (  ) focal deficits  Musculoskeletal:    (   )  normal strength b/l upper  (     ) normal  lower extremities  Skin: No rashes    MEDICATIONS:  MEDICATIONS  (STANDING):  dextrose 5%. 1000 milliLiter(s) (50 mL/Hr) IV Continuous <Continuous>  dextrose 50% Injectable 12.5 Gram(s) IV Push once  dextrose 50% Injectable 25 Gram(s) IV Push once  dextrose 50% Injectable 25 Gram(s) IV Push once  insulin lispro (HumaLOG) corrective regimen sliding scale   SubCutaneous every 6 hours      LABS: All Labs Reviewed:                        12.5   7.27  )-----------( 266      ( 13 Nov 2019 06:30 )             38.2     11-13    143  |  102  |  19  ----------------------------<  124<H>  3.8   |  25  |  0.76    Ca    9.3      13 Nov 2019 06:30  Phos  4.6     11-12  Mg     1.9     11-12    TPro  8.0  /  Alb  3.5  /  TBili  0.7  /  DBili  x   /  AST  36  /  ALT  29  /  AlkPhos  127<H>  11-12    PT/INR - ( 12 Nov 2019 08:00 )   PT: 12.3 SEC;   INR: 1.08          PTT - ( 12 Nov 2019 08:00 )  PTT:29.9 SEC      Blood Culture:   Urine Culture      RADIOLOGY/EKG:  < from: CT Abdomen and Pelvis w/ Oral Cont and w/ IV Cont (11.12.19 @ 12:34) >  INTERPRETATION:  CLINICAL INFORMATION: PEG tube with multiple episodes of   emesis overnight. Assess for obstruction.    COMPARISON: CT chest, abdomen and pelvis 11/3/2016.    PROCEDURE:   CT of the Abdomen and Pelvis was performed with intravenous contrast.   Intravenous contrast: 90 ml Omnipaque 350. 10 ml discarded.  Oral contrast: positive contrast was administered.  Sagittal and coronal reformats were performed.    FINDINGS:    LOWER CHEST: A 0.3 cm ovoid right perifissural nodule (series 2, image   8), likely intrapulmonary lymph node, unchanged from 2016. Coronary   artery atherosclerotic calcifications. Partially imaged distal esophageal   wall thickening, new since 2016. A prominent paraesophageal node   measuring 1.1 x 0.8 cm (series 2, image 6), previously 1 x 0.7 cm.    LIVER: Normal morphology. Diffuse steatosis. No suspicious enhancing   lesions.  BILE DUCTS: Normal caliber.  GALLBLADDER: Withinnormal limits.  SPLEEN: Within normal limits.  PANCREAS: Within normal limits.  ADRENALS: Within normal limits.  KIDNEYS/URETERS: Within normal limits.    BLADDER: Trabeculated urinary bladder wall with several small diverticula   measuring up to 0.6cm right posterior laterally. Additionally, numerous   calcifications within the dependent urinary bladder, mainly within the   wall trabeculations, likely stones.   REPRODUCTIVE ORGANS: Normal size prostate.    BOWEL: Distal esophageal wall thickening, as above. A small hiatal   hernia. Percutaneous gastrostomy with retention balloon in the   underdistended stomach. No bowel obstruction. Appendix is not visualized.  PERITONEUM: No ascites.  VESSELS: Atherosclerotic change.  RETROPERITONEUM/LYMPHNODES: No lymphadenopathy.    ABDOMINAL WALL:  Gastrostomy tube, as above.  BONES: Degenerative changes.    IMPRESSION:     Gastrostomy tube with retention balloon in the underdistended stomach. No   bowel obstruction.    Distal esophageal wall thickening with a minimally prominent   paraesophageal node, nonspecific. Correlate clinically for esophagitis.   Consider endoscopy as clinically warranted.    Trabeculated urinary bladder wall with small calcifications, likely   stones.    Hepatic steatosis.    The findings were discussed by Dr. Zafar with Dr. Jansen with repeat back   confirmation at 1:24 PM on 11/12/2019.          < end of copied text >    ASSESSMENT AND PLAN:    DVT PPX:    ADVANCED DIRECTIVE:    DISPOSITION: CHIEF COMPLAINT:  ·  The patient is a 74y Male complaining of	  ·   patient is  74M PMH SAH now non-verbal, bed bound, dysphagia s/p PEG p/w multiple episodes of emesis overnight. Per staff pt has been having nightly emesis for the past month, dietician has been adjusting his tube feed rates. Now takes glucerna 40cc/hr with 50cc flushes. Report from nursing facility states that pt had coffee ground emesis, nurse at facility is unsure if coffee ground emesis was present.      	    SUBJECTIVE: awake nonverbal    REVIEW OF SYSTEMS:  Pt is nonverbal and is unable to particiapte in ROS.  CONSTITUTIONAL: (  )  weakness,  (  ) fevers or chills  EYES/ENT: (  )visual changes;     NECK: (  ) pain or stiffness  RESPIRATORY:   (  )cough, wheezing, hemoptysis;  (  ) shortness of breath  CARDIOVASCULAR:  (  )chest pain or palpitations  GASTROINTESTINAL:   (  )abdominal or epigastric pain.  (  ) nausea, vomiting, or hematemesis;   (   ) diarrhea or constipation.   GENITOURINARY:   (    ) dysuria, frequency or hematuria  NEUROLOGICAL:  (   ) numbness or weakness   All other review of systems is negative unless indicated above    Vital Signs Last 24 Hrs  T(C): 36.3 (13 Nov 2019 06:58), Max: 37.2 (12 Nov 2019 21:45)  T(F): 97.4 (13 Nov 2019 06:58), Max: 99 (12 Nov 2019 21:45)  HR: 83 (13 Nov 2019 06:58) (83 - 97)  BP: 127/81 (13 Nov 2019 06:58) (127/81 - 142/72)  BP(mean): --  RR: 18 (13 Nov 2019 06:58) (16 - 18)  SpO2: 97% (13 Nov 2019 06:58) (97% - 100%)    I&O's Summary    12 Nov 2019 07:01  -  13 Nov 2019 07:00  --------------------------------------------------------  IN: 0 mL / OUT: 320 mL / NET: -320 mL        CAPILLARY BLOOD GLUCOSE      POCT Blood Glucose.: 118 mg/dL (13 Nov 2019 08:37)  POCT Blood Glucose.: 117 mg/dL (13 Nov 2019 06:38)  POCT Blood Glucose.: 130 mg/dL (13 Nov 2019 00:21)  POCT Blood Glucose.: 155 mg/dL (12 Nov 2019 21:41)              Physical Exam: ·  Gen: NAD, nonverbal   Head: NCAT   HEENT: PERRL, oral mucosa moist, normal conjunctiva, oropharynx clear without exudate or erythema   Lung: CTAB, no respiratory distress, no wheezing, rales, rhonchi   CV: normal s1/s2, regular rate, normal rhythm, no murmurs, Normal perfusion, pulses 2+ throughout   Abd: soft, NT, distended, +PEG, skin site CDI   MSK: upper and lower extremity contractures, does not follow commands   Neuro: unable to assess 2/2 non-verbal, does not follow commands Skin: No rash	      MEDICATIONS:  MEDICATIONS  (STANDING):  dextrose 5%. 1000 milliLiter(s) (50 mL/Hr) IV Continuous <Continuous>  dextrose 50% Injectable 12.5 Gram(s) IV Push once  dextrose 50% Injectable 25 Gram(s) IV Push once  dextrose 50% Injectable 25 Gram(s) IV Push once  insulin lispro (HumaLOG) corrective regimen sliding scale   SubCutaneous every 6 hours      LABS: All Labs Reviewed:                        12.5   7.27  )-----------( 266      ( 13 Nov 2019 06:30 )             38.2     11-13    143  |  102  |  19  ----------------------------<  124<H>  3.8   |  25  |  0.76    Ca    9.3      13 Nov 2019 06:30  Phos  4.6     11-12  Mg     1.9     11-12    TPro  8.0  /  Alb  3.5  /  TBili  0.7  /  DBili  x   /  AST  36  /  ALT  29  /  AlkPhos  127<H>  11-12    PT/INR - ( 12 Nov 2019 08:00 )   PT: 12.3 SEC;   INR: 1.08          PTT - ( 12 Nov 2019 08:00 )  PTT:29.9 SEC      Blood Culture:   Urine Culture      RADIOLOGY/EKG:  < from: CT Abdomen and Pelvis w/ Oral Cont and w/ IV Cont (11.12.19 @ 12:34) >  INTERPRETATION:  CLINICAL INFORMATION: PEG tube with multiple episodes of   emesis overnight. Assess for obstruction.    COMPARISON: CT chest, abdomen and pelvis 11/3/2016.    PROCEDURE:   CT of the Abdomen and Pelvis was performed with intravenous contrast.   Intravenous contrast: 90 ml Omnipaque 350. 10 ml discarded.  Oral contrast: positive contrast was administered.  Sagittal and coronal reformats were performed.    FINDINGS:    LOWER CHEST: A 0.3 cm ovoid right perifissural nodule (series 2, image   8), likely intrapulmonary lymph node, unchanged from 2016. Coronary   artery atherosclerotic calcifications. Partially imaged distal esophageal   wall thickening, new since 2016. A prominent paraesophageal node   measuring 1.1 x 0.8 cm (series 2, image 6), previously 1 x 0.7 cm.    LIVER: Normal morphology. Diffuse steatosis. No suspicious enhancing   lesions.  BILE DUCTS: Normal caliber.  GALLBLADDER: Withinnormal limits.  SPLEEN: Within normal limits.  PANCREAS: Within normal limits.  ADRENALS: Within normal limits.  KIDNEYS/URETERS: Within normal limits.    BLADDER: Trabeculated urinary bladder wall with several small diverticula   measuring up to 0.6cm right posterior laterally. Additionally, numerous   calcifications within the dependent urinary bladder, mainly within the   wall trabeculations, likely stones.   REPRODUCTIVE ORGANS: Normal size prostate.    BOWEL: Distal esophageal wall thickening, as above. A small hiatal   hernia. Percutaneous gastrostomy with retention balloon in the   underdistended stomach. No bowel obstruction. Appendix is not visualized.  PERITONEUM: No ascites.  VESSELS: Atherosclerotic change.  RETROPERITONEUM/LYMPHNODES: No lymphadenopathy.    ABDOMINAL WALL:  Gastrostomy tube, as above.  BONES: Degenerative changes.    IMPRESSION:     Gastrostomy tube with retention balloon in the underdistended stomach. No   bowel obstruction.    Distal esophageal wall thickening with a minimally prominent   paraesophageal node, nonspecific. Correlate clinically for esophagitis.   Consider endoscopy as clinically warranted.    Trabeculated urinary bladder wall with small calcifications, likely   stones.    Hepatic steatosis.    The findings were discussed by Dr. Zafar with Dr. Jansen with repeat back   confirmation at 1:24 PM on 11/12/2019.          < end of copied text >    ASSESSMENT AND PLAN:  74M PMH SAH now non-verbal, bed bound, dysphagia s/p PEG p/w multiple episodes of emesis overnight. Per staff pt has been having nightly emesis for the past month, dietician has been adjusting his tube feed rates. Now takes glucerna 40cc/hr with 50cc flushes. Report from nursing facility states that pt had coffee ground emesis, nurse at facility is unsure if coffee ground emesis was present.  assessment and plan  #GI/vomiting rule out obstruction abdomen distended discussed with the ER attending to do flat abdomen and CT scan will keep him nothing  off feeding today.  Will restart feeding/ low-dose 30 cc only  #Diabetes will hold off insulin start fingerstick coverage 24 hours  fingerstick acceptable  #s/p SAH  continue modafinil 10  mg    DVT PPX:    ADVANCED DIRECTIVE:    DISPOSITION:

## 2019-11-13 NOTE — PROGRESS NOTE ADULT - SUBJECTIVE AND OBJECTIVE BOX
Olu Anthony MD  Interventional Cardiology / Endovascular Specialist  [place] Office : [address] [place] [address] [place]. [address]  Tel:   [place] Office : [address] [facility] [place] [address]  Tel: [phone]  Cell : [phone]    HISTORY OF PRESENTING ILLNESS:  ]  74M PMH SAH now non-verbal, bed bound, dysphagia s/p PEG p/w multiple episodes of emesis    MEDICATIONS:            dextrose 40% Gel 15 Gram(s) Oral once PRN  dextrose 50% Injectable 12.5 Gram(s) IV Push once  dextrose 50% Injectable 25 Gram(s) IV Push once  dextrose 50% Injectable 25 Gram(s) IV Push once  glucagon  Injectable 1 milliGRAM(s) IntraMuscular once PRN  insulin lispro (HumaLOG) corrective regimen sliding scale   SubCutaneous every 6 hours    dextrose 5%. 1000 milliLiter(s) IV Continuous <Continuous>      PAST MEDICAL/SURGICAL HISTORY  PAST MEDICAL & SURGICAL HISTORY:  Arthritis  Hemorrhoids, unspecified hemorrhoid type  SAH (subarachnoid hemorrhage)  BPH (benign prostatic hypertrophy)  Gastritis  Dyslipidemia  HTN (Hypertension), Benign  Diabetes  CAD (Coronary Artery Disease)  Stented coronary artery  Appendicitis      SOCIAL HISTORY: Substance Use (street drugs): ( x ) never used  (  ) other:    FAMILY HISTORY:  Family history of liver disease: Mother  from liver disease 40 yo  Cerebrovascular accident (CVA): Father 82 yo      REVIEW OF SYSTEMS:  unable to obtain     PHYSICAL EXAM:  T(C): 36.3 (19 @ 06:58), Max: 37.2 (19 @ 21:45)  HR: 83 (19 @ 06:58) (83 - 91)  BP: 127/81 (19 @ 06:58) (127/81 - 142/72)  RR: 18 (19 @ 06:58) (16 - 18)  SpO2: 97% (19 @ 06:58) (97% - 100%)  Wt(kg): --  I&O's Summary    2019 07:01  -  2019 07:00  --------------------------------------------------------  IN: 0 mL / OUT: 320 mL / NET: -320 mL        Weight (kg): 49.5 ( @ 03:00)    GENERAL: NAD, nonverbal   ENMT: No tonsillar erythema, exudates, or enlargement; Moist mucous membranes  Cardiovascular: Normal S1 S2, No JVD, No murmurs, No edema  Respiratory: decreased b/s B/L   Gastrointestinal:  Soft, Non-tender, + BS	, peg +   Extremities:  No clubbing, cyanosis or edema  LYMPH: No lymphadenopathy noted                              12.5   7.27  )-----------( 266      ( 2019 06:30 )             38.2     -    143  |  102  |  19  ----------------------------<  124<H>  3.8   |  25  |  0.76    Ca    9.3      2019 06:30  Phos  4.6     11-12  Mg     1.9     -    TPro  8.0  /  Alb  3.5  /  TBili  0.7  /  DBili  x   /  AST  36  /  ALT  29  /  AlkPhos  127<H>  11-12    proBNP:   Lipid Profile:   HgA1c:   TSH:     Consultant(s) Notes Reviewed:  [x ] YES  [ ] NO    Care Discussed with Consultants/Other Providers [ x] YES  [ ] NO    Imaging Personally Reviewed independently:  [x] YES  [ ] NO    All labs, radiologic studies, vitals, orders and medications list reviewed. Patient is seen and examined at bedside. Case discussed with medical team.

## 2019-11-14 LAB
GLUCOSE BLDC GLUCOMTR-MCNC: 139 MG/DL — HIGH (ref 70–99)
GLUCOSE BLDC GLUCOMTR-MCNC: 144 MG/DL — HIGH (ref 70–99)
GLUCOSE BLDC GLUCOMTR-MCNC: 150 MG/DL — HIGH (ref 70–99)
GLUCOSE BLDC GLUCOMTR-MCNC: 173 MG/DL — HIGH (ref 70–99)
HCT VFR BLD CALC: 46.4 % — SIGNIFICANT CHANGE UP (ref 39–50)
HGB BLD-MCNC: 14.8 G/DL — SIGNIFICANT CHANGE UP (ref 13–17)
MCHC RBC-ENTMCNC: 30.9 PG — SIGNIFICANT CHANGE UP (ref 27–34)
MCHC RBC-ENTMCNC: 31.9 % — LOW (ref 32–36)
MCV RBC AUTO: 96.9 FL — SIGNIFICANT CHANGE UP (ref 80–100)
NRBC # FLD: 0 K/UL — SIGNIFICANT CHANGE UP (ref 0–0)
PLATELET # BLD AUTO: 304 K/UL — SIGNIFICANT CHANGE UP (ref 150–400)
PMV BLD: 11.1 FL — SIGNIFICANT CHANGE UP (ref 7–13)
RBC # BLD: 4.79 M/UL — SIGNIFICANT CHANGE UP (ref 4.2–5.8)
RBC # FLD: 14.1 % — SIGNIFICANT CHANGE UP (ref 10.3–14.5)
WBC # BLD: 9.32 K/UL — SIGNIFICANT CHANGE UP (ref 3.8–10.5)
WBC # FLD AUTO: 9.32 K/UL — SIGNIFICANT CHANGE UP (ref 3.8–10.5)

## 2019-11-14 PROCEDURE — 99222 1ST HOSP IP/OBS MODERATE 55: CPT | Mod: GC

## 2019-11-14 RX ORDER — ATORVASTATIN CALCIUM 80 MG/1
20 TABLET, FILM COATED ORAL AT BEDTIME
Refills: 0 | Status: DISCONTINUED | OUTPATIENT
Start: 2019-11-14 | End: 2019-11-18

## 2019-11-14 RX ORDER — FAMOTIDINE 10 MG/ML
20 INJECTION INTRAVENOUS DAILY
Refills: 0 | Status: DISCONTINUED | OUTPATIENT
Start: 2019-11-14 | End: 2019-11-15

## 2019-11-14 RX ADMIN — ATORVASTATIN CALCIUM 20 MILLIGRAM(S): 80 TABLET, FILM COATED ORAL at 23:37

## 2019-11-14 RX ADMIN — FAMOTIDINE 20 MILLIGRAM(S): 10 INJECTION INTRAVENOUS at 15:58

## 2019-11-14 RX ADMIN — Medication 1: at 18:11

## 2019-11-14 NOTE — CONSULT NOTE ADULT - ATTENDING COMMENTS
Patient seen and examined with the GI fellow. I agree with the above assessment and plan. Thank you for allowing us to care for your patient.    Likely with esophagitis given the history and CT findings.  Pls start on PPI BID and manage conservatively. EGD on placement of PEG was negative for an esophageal lesion.

## 2019-11-14 NOTE — CONSULT NOTE ADULT - SUBJECTIVE AND OBJECTIVE BOX
Patient is a 74y old  Male who presents with a chief complaint of vomiting 3 times since 1 AM last event was coffee-ground high (2019 12:11)      HPI:  74M PMH SAH now non-verbal, bed bound, dysphagia s/p PEG p/w multiple episodes of emesis overnight. Per staff pt has been having nightly emesis for the past month, dietician has been adjusting his tube feed rates. Now takes glucerna 40cc/hr with 50cc flushes. Report from nursing facility states that pt had coffee ground emesis, nurse at facility is unsure if coffee ground emesis was present.     In ED vital signs notable for tachycardia (105), otherwise vss. CTAP notable for distal esophageal wall thickening, suggestive of esophagitis.       PAST MEDICAL & SURGICAL HISTORY:  Arthritis  Hemorrhoids, unspecified hemorrhoid type  SAH (subarachnoid hemorrhage)  BPH (benign prostatic hypertrophy)  Gastritis  Dyslipidemia  HTN (Hypertension), Benign  Diabetes  CAD (Coronary Artery Disease)  Stented coronary artery  Appendicitis      MEDICATIONS  (STANDING):  atorvastatin 20 milliGRAM(s) Oral at bedtime  dextrose 5%. 1000 milliLiter(s) (50 mL/Hr) IV Continuous <Continuous>  dextrose 50% Injectable 12.5 Gram(s) IV Push once  dextrose 50% Injectable 25 Gram(s) IV Push once  dextrose 50% Injectable 25 Gram(s) IV Push once  famotidine    Tablet 20 milliGRAM(s) Oral daily  insulin lispro (HumaLOG) corrective regimen sliding scale   SubCutaneous every 6 hours    MEDICATIONS  (PRN):  dextrose 40% Gel 15 Gram(s) Oral once PRN Blood Glucose LESS THAN 70 milliGRAM(s)/deciliter  glucagon  Injectable 1 milliGRAM(s) IntraMuscular once PRN Glucose LESS THAN 70 milligrams/deciliter      Allergies    No Known Allergies    Intolerances        FAMILY HISTORY:  Family history of liver disease: Mother  from liver disease 42 yo  Cerebrovascular accident (CVA): Father 80 yo      SOCIAL    REVIEW OF SYSTEMS    Vital Signs Last 24 Hrs  T(C): 36.3 (2019 13:16), Max: 36.6 (2019 06:49)  T(F): 97.3 (2019 13:16), Max: 97.8 (2019 06:49)  HR: 100 (2019 13:16) (95 - 100)  BP: 101/72 (2019 13:16) (101/72 - 147/98)  BP(mean): --  RR: 18 (2019 13:16) (18 - 18)  SpO2: 98% (2019 13:16) (98% - 98%)    PHYSICAL EXAM:      Constitutional:    Eyes:    ENMT:    Neck:    Breasts:    Back:    Respiratory:    Cardiovascular:    Gastrointestinal:    Genitourinary:    Rectal:    Extremities:    Vascular:    Neurological:    Skin:    Lymph Nodes:    Musculoskeletal:    Psychiatric:        CBC Full  -  ( 2019 06:30 )  WBC Count : 9.32 K/uL  RBC Count : 4.79 M/uL  Hemoglobin : 14.8 g/dL  Hematocrit : 46.4 %  Platelet Count - Automated : 304 K/uL  Mean Cell Volume : 96.9 fL  Mean Cell Hemoglobin : 30.9 pg  Mean Cell Hemoglobin Concentration : 31.9 %  Auto Neutrophil # : x  Auto Lymphocyte # : x  Auto Monocyte # : x  Auto Eosinophil # : x  Auto Basophil # : x  Auto Neutrophil % : x  Auto Lymphocyte % : x  Auto Monocyte % : x  Auto Eosinophil % : x  Auto Basophil % : x          11-13    143  |  102  |  19  ----------------------------<  124<H>  3.8   |  25  |  0.76    Ca    9.3      2019 06:30            AMYLASE                   @ 07:55   --  LIPASE                    @ 07:55  35.5  HCG  --                     @ 07:55      RADIOLOGY    CTAP:    IMPRESSION:     Gastrostomy tube with retention balloon in the underdistended stomach. No   bowel obstruction.    Distal esophageal wall thickening with a minimally prominent   paraesophageal node, nonspecific. Correlate clinically for esophagitis.   Consider endoscopy as clinically warranted.    Trabeculated urinary bladder wall with small calcifications, likely   stones.    Hepatic steatosis. Patient is a 74y old  Male who presents with a chief complaint of vomiting 3 times since 1 AM last event was coffee-ground high (2019 12:11)      HPI:  74M PMH SAH now non-verbal, bed bound, dysphagia s/p PEG p/w multiple episodes of emesis overnight. Per staff pt has been having nightly emesis for the past month, dietician has been adjusting his tube feed rates. Now takes glucerna 40cc/hr with 50cc flushes. Report from nursing facility states that pt had coffee ground emesis, nurse at facility is unsure if coffee ground emesis was present. Of note, pt has had one prior episode of hematemesis that occurred 2018 during which pt was hospitalized here - EGD was not done at that time as hematemesis had resolved. .     Per family at bedside, pt at baseline has soft stool - unknown if pt ever had black stools. Peg [facility] at [facility] in 2018 after SAH. Pt gets bolus feeds at nursing home. Pt had colonoscopy 4-5 years ago - family believes it was normal. Pt has history of acid reflux - took peptobismol and ?PPI.     In ED vital signs notable for tachycardia (105), otherwise vss. CTAP notable for distal esophageal wall thickening, suggestive of esophagitis.     Peg tube site flushed and aspirated at bedside - specks of hematin, no signs of active bleed.       PAST MEDICAL & SURGICAL HISTORY:  Arthritis  Hemorrhoids, unspecified hemorrhoid type  SAH (subarachnoid hemorrhage)  BPH (benign prostatic hypertrophy)  Gastritis  Dyslipidemia  HTN (Hypertension), Benign  Diabetes  CAD (Coronary Artery Disease)  Stented coronary artery  Appendicitis      MEDICATIONS  (STANDING):  atorvastatin 20 milliGRAM(s) Oral at bedtime  dextrose 5%. 1000 milliLiter(s) (50 mL/Hr) IV Continuous <Continuous>  dextrose 50% Injectable 12.5 Gram(s) IV Push once  dextrose 50% Injectable 25 Gram(s) IV Push once  dextrose 50% Injectable 25 Gram(s) IV Push once  famotidine    Tablet 20 milliGRAM(s) Oral daily  insulin lispro (HumaLOG) corrective regimen sliding scale   SubCutaneous every 6 hours    MEDICATIONS  (PRN):  dextrose 40% Gel 15 Gram(s) Oral once PRN Blood Glucose LESS THAN 70 milliGRAM(s)/deciliter  glucagon  Injectable 1 milliGRAM(s) IntraMuscular once PRN Glucose LESS THAN 70 milligrams/deciliter      Allergies    No Known Allergies    Intolerances        FAMILY HISTORY:  Family history of liver disease: Mother  from liver disease 40 yo  Cerebrovascular accident (CVA): Father 80 yo  No family history of gastric/esophageal/colon cancer.     SOCIAL: lives at nursing home. History of distant tobacco use. No alcohol or drug use.       Vital Signs Last 24 Hrs  T(C): 36.3 (2019 13:16), Max: 36.6 (2019 06:49)  T(F): 97.3 (2019 13:16), Max: 97.8 (2019 06:49)  HR: 100 (2019 13:16) (95 - 100)  BP: 101/72 (2019 13:16) (101/72 - 147/98)  BP(mean): --  RR: 18 (2019 13:16) (18 - 18)  SpO2: 98% (2019 13:16) (98% - 98%)    PHYSICAL EXAM:  Constitutional: bedbound, nonverbal.  HEENT: clear sclera, NC, AT.   Respiratory: CTAB - breath sounds diminished due to poor inspiratory effort  Gastrointestinal: peg site clean/dry. soft, nontender, nondistended.   Extremities: contracted extremities      CBC Full  -  ( 2019 06:30 )  WBC Count : 9.32 K/uL  RBC Count : 4.79 M/uL  Hemoglobin : 14.8 g/dL  Hematocrit : 46.4 %  Platelet Count - Automated : 304 K/uL  Mean Cell Volume : 96.9 fL  Mean Cell Hemoglobin : 30.9 pg  Mean Cell Hemoglobin Concentration : 31.9 %          143  |  102  |  19  ----------------------------<  124<H>  3.8   |  25  |  0.76    Ca    9.3      2019 06:30        LIPASE                   11- @ 07:55  35.5    RADIOLOGY    CTAP:    IMPRESSION:     Gastrostomy tube with retention balloon in the underdistended stomach. No   bowel obstruction.    Distal esophageal wall thickening with a minimally prominent   paraesophageal node, nonspecific. Correlate clinically for esophagitis.   Consider endoscopy as clinically warranted.    Trabeculated urinary bladder wall with small calcifications, likely   stones.    Hepatic steatosis. Patient is a 74y old  Male who presents with a chief complaint of vomiting 3 times since 1 AM last event was coffee-ground high (2019 12:11)      HPI:  74M PMH SAH now non-verbal, bed bound, dysphagia s/p PEG p/w multiple episodes of emesis overnight. Per staff pt has been having nightly emesis for the past month, dietician has been adjusting his tube feed rates. Now takes glucerna 40cc/hr with 50cc flushes. Report from nursing facility states that pt had coffee ground emesis, nurse at facility is unsure if coffee ground emesis was present. Of note, pt has had one prior episode of hematemesis that occurred 2018 during which pt was hospitalized here - EGD was not done at that time as hematemesis had resolved. .     Per family at bedside, pt at baseline has soft stool - unknown if pt ever had black stools. Peg was placed at [facility] in 2018 after SAH. Pt gets bolus feeds at nursing home. Pt had colonoscopy 4-5 years ago - family believes it was normal. Pt has history of acid reflux - took peptobismol and ?PPI.     In ED vital signs notable for tachycardia (105), otherwise vss. CTAP notable for new distal esophageal wall thickening and unchanged prominent paraesophageal node.    Peg tube site flushed and aspirated at bedside - specks of hematin, no signs of active bleed.       PAST MEDICAL & SURGICAL HISTORY:  Arthritis  Hemorrhoids, unspecified hemorrhoid type  SAH (subarachnoid hemorrhage)  BPH (benign prostatic hypertrophy)  Gastritis  Dyslipidemia  HTN (Hypertension), Benign  Diabetes  CAD (Coronary Artery Disease)  Stented coronary artery  Appendicitis      MEDICATIONS  (STANDING):  atorvastatin 20 milliGRAM(s) Oral at bedtime  dextrose 5%. 1000 milliLiter(s) (50 mL/Hr) IV Continuous <Continuous>  dextrose 50% Injectable 12.5 Gram(s) IV Push once  dextrose 50% Injectable 25 Gram(s) IV Push once  dextrose 50% Injectable 25 Gram(s) IV Push once  famotidine    Tablet 20 milliGRAM(s) Oral daily  insulin lispro (HumaLOG) corrective regimen sliding scale   SubCutaneous every 6 hours    MEDICATIONS  (PRN):  dextrose 40% Gel 15 Gram(s) Oral once PRN Blood Glucose LESS THAN 70 milliGRAM(s)/deciliter  glucagon  Injectable 1 milliGRAM(s) IntraMuscular once PRN Glucose LESS THAN 70 milligrams/deciliter      Allergies    No Known Allergies    Intolerances        FAMILY HISTORY:  Family history of liver disease: Mother  from liver disease 42 yo  Cerebrovascular accident (CVA): Father 80 yo  No family history of gastric/esophageal/colon cancer.     SOCIAL: lives at nursing home. History of distant tobacco use. No alcohol or drug use.       Vital Signs Last 24 Hrs  T(C): 36.3 (2019 13:16), Max: 36.6 (2019 06:49)  T(F): 97.3 (2019 13:16), Max: 97.8 (2019 06:49)  HR: 100 (2019 13:16) (95 - 100)  BP: 101/72 (2019 13:16) (101/72 - 147/98)  BP(mean): --  RR: 18 (2019 13:16) (18 - 18)  SpO2: 98% (2019 13:16) (98% - 98%)    PHYSICAL EXAM:  Constitutional: bedbound, nonverbal.  HEENT: clear sclera, NC, AT.   Respiratory: CTAB - breath sounds diminished due to poor inspiratory effort  Gastrointestinal: peg site clean/dry. soft, nontender, nondistended.   Extremities: contracted extremities      CBC Full  -  ( 2019 06:30 )  WBC Count : 9.32 K/uL  RBC Count : 4.79 M/uL  Hemoglobin : 14.8 g/dL  Hematocrit : 46.4 %  Platelet Count - Automated : 304 K/uL  Mean Cell Volume : 96.9 fL  Mean Cell Hemoglobin : 30.9 pg  Mean Cell Hemoglobin Concentration : 31.9 %          143  |  102  |  19  ----------------------------<  124<H>  3.8   |  25  |  0.76    Ca    9.3      2019 06:30        LIPASE                    @ 07:55  35.5    RADIOLOGY    < from: CT Abdomen and Pelvis w/ Oral Cont and w/ IV Cont (19 @ 12:34) >  IMPRESSION:     Gastrostomy tube with retention balloon in the underdistended stomach. No   bowel obstruction.    Distal esophageal wall thickening with a minimally prominent   paraesophageal node, nonspecific. Correlate clinically for esophagitis.   Consider endoscopy as clinically warranted.    Trabeculated urinary bladder wall with small calcifications, likely   stones.    Hepatic steatosis.      < end of copied text >

## 2019-11-14 NOTE — PROGRESS NOTE ADULT - SUBJECTIVE AND OBJECTIVE BOX
Olu Anthony MD  Interventional Cardiology / Endovascular Specialist  [place] Office : [phone] [place], [place] [address]. [address]  Tel:   [place] Office : [address] [facility] [place] [address]  Tel: [phone]  Cell : [phone]    HISTORY OF PRESENTING ILLNESS:    74M PMH SAH now non-verbal, bed bound, dysphagia s/p PEG p/w multiple episodes of emesis  	  MEDICATIONS:            dextrose 40% Gel 15 Gram(s) Oral once PRN  dextrose 50% Injectable 12.5 Gram(s) IV Push once  dextrose 50% Injectable 25 Gram(s) IV Push once  dextrose 50% Injectable 25 Gram(s) IV Push once  glucagon  Injectable 1 milliGRAM(s) IntraMuscular once PRN  insulin lispro (HumaLOG) corrective regimen sliding scale   SubCutaneous every 6 hours    dextrose 5%. 1000 milliLiter(s) IV Continuous <Continuous>      PAST MEDICAL/SURGICAL HISTORY  PAST MEDICAL & SURGICAL HISTORY:  Arthritis  Hemorrhoids, unspecified hemorrhoid type  SAH (subarachnoid hemorrhage)  BPH (benign prostatic hypertrophy)  Gastritis  Dyslipidemia  HTN (Hypertension), Benign  Diabetes  CAD (Coronary Artery Disease)  Stented coronary artery  Appendicitis      SOCIAL HISTORY: Substance Use (street drugs): ( x ) never used  (  ) other:    FAMILY HISTORY:  Family history of liver disease: Mother  from liver disease 42 yo  Cerebrovascular accident (CVA): Father 82 yo      REVIEW OF SYSTEMS:  unable to obtain     PHYSICAL EXAM:  T(C): 36.6 (19 @ 06:49), Max: 36.6 (19 @ 06:49)  HR: 100 (19 @ 06:49) (89 - 100)  BP: 147/98 (19 @ 06:49) (117/71 - 147/98)  RR: 18 (19 @ 06:49) (18 - 18)  SpO2: 98% (19 @ 06:49) (97% - 98%)  Wt(kg): --  I&O's Summary      GENERAL: NAD, nonverbal   ENMT: No tonsillar erythema, exudates, or enlargement; Moist mucous membranes  Cardiovascular: Normal S1 S2, No JVD, No murmurs, No edema  Respiratory: decreased b/s B/L   Gastrointestinal:  Soft, Non-tender, + BS	, peg +   Extremities:  No clubbing, cyanosis or edema  LYMPH: No lymphadenopathy noted                                  14.8   9.32  )-----------( 304      ( 2019 06:30 )             46.4     11-    143  |  102  |  19  ----------------------------<  124<H>  3.8   |  25  |  0.76    Ca    9.3      2019 06:30      proBNP:   Lipid Profile:   HgA1c:   TSH:     Consultant(s) Notes Reviewed:  [x ] YES  [ ] NO    Care Discussed with Consultants/Other Providers [ x] YES  [ ] NO    Imaging Personally Reviewed independently:  [x] YES  [ ] NO    All labs, radiologic studies, vitals, orders and medications list reviewed. Patient is seen and examined at bedside. Case discussed with medical team.

## 2019-11-14 NOTE — CONSULT NOTE ADULT - REASON FOR ADMISSION
vomiting 3 times since 1 AM last event was coffee-ground high
vomiting 3 times since 1 AM last event was coffee-ground high

## 2019-11-14 NOTE — PROGRESS NOTE ADULT - SUBJECTIVE AND OBJECTIVE BOX
CHIEF COMPLAINT: no events overnight    patient is 74M PMH SAH now non-verbal, bed bound, dysphagia s/p PEG p/w multiple episodes of emesis overnight. Per staff pt has been having nightly emesis for the past month, dietician has been adjusting his tube feed rates. Now takes glucerna 40cc/hr with 50cc flushes. Report from nursing facility states that pt had coffee ground emesis, nurse at facility is unsure if coffee ground emesis was present.  SUBJECTIVE:     REVIEW OF SYSTEMS: awake nonverbal    CONSTITUTIONAL: ( x )  weakness,  (  ) fevers or chills  EYES/ENT: (  )visual changes;     NECK: (  ) pain or stiffness  RESPIRATORY:   (  )cough, wheezing, hemoptysis;  (  ) shortness of breath  CARDIOVASCULAR:  (  )chest pain or palpitations  GASTROINTESTINAL:   (  )abdominal or epigastric pain.  (  ) nausea, vomiting, or hematemesis;   (   ) diarrhea or constipation.   GENITOURINARY:   (    ) dysuria, frequency or hematuria  NEUROLOGICAL:  (   ) numbness or weakness   All other review of systems is negative unless indicated above    Vital Signs Last 24 Hrs  T(C): 36.3 (14 Nov 2019 13:16), Max: 36.6 (14 Nov 2019 06:49)  T(F): 97.3 (14 Nov 2019 13:16), Max: 97.8 (14 Nov 2019 06:49)  HR: 100 (14 Nov 2019 13:16) (95 - 100)  BP: 101/72 (14 Nov 2019 13:16) (101/72 - 147/98)  BP(mean): --  RR: 18 (14 Nov 2019 13:16) (18 - 18)  SpO2: 98% (14 Nov 2019 13:16) (98% - 98%)    I&O's Summary      CAPILLARY BLOOD GLUCOSE      POCT Blood Glucose.: 173 mg/dL (14 Nov 2019 17:51)  POCT Blood Glucose.: 150 mg/dL (14 Nov 2019 12:02)  POCT Blood Glucose.: 139 mg/dL (14 Nov 2019 06:43)  POCT Blood Glucose.: 129 mg/dL (13 Nov 2019 22:25)      PHYSICAL EXAM:        : ·  Gen: NAD, nonverbal   Head: NCAT   HEENT: PERRL, oral mucosa moist, normal conjunctiva, oropharynx clear without exudate or erythema   Lung: CTAB, no respiratory distress, no wheezing, rales, rhonchi   CV: normal s1/s2, regular rate, normal rhythm, no murmurs, Normal perfusion, pulses 2+ throughout   Abd: soft, NT, distended, +PEG, skin site CDI   MSK: upper and lower extremity contractures, does not follow commands   Neuro: unable to assess 2/2 non-verbal, does not follow commands Skin: No rash	    MEDICATIONS:  MEDICATIONS  (STANDING):  atorvastatin 20 milliGRAM(s) Oral at bedtime  dextrose 5%. 1000 milliLiter(s) (50 mL/Hr) IV Continuous <Continuous>  dextrose 50% Injectable 12.5 Gram(s) IV Push once  dextrose 50% Injectable 25 Gram(s) IV Push once  dextrose 50% Injectable 25 Gram(s) IV Push once  famotidine    Tablet 20 milliGRAM(s) Oral daily  insulin lispro (HumaLOG) corrective regimen sliding scale   SubCutaneous every 6 hours      LABS: All Labs Reviewed:                        14.8   9.32  )-----------( 304      ( 14 Nov 2019 06:30 )             46.4     11-13    143  |  102  |  19  ----------------------------<  124<H>  3.8   |  25  |  0.76    Ca    9.3      13 Nov 2019 06:30            Blood Culture:   Urine Culture      RADIOLOGY/EKG:    ASSESSMENT AND PLAN:  74M PMH SAH now non-verbal, bed bound, dysphagia s/p PEG p/w multiple episodes of emesis overnight. Per staff pt has been having nightly emesis for the past month, dietician has been adjusting his tube feed rates. Now takes glucerna 40cc/hr with 50cc flushes. Report from nursing facility states that pt had coffee ground emesis, nurse at facility is unsure if coffee ground emesis was present.  assessment and plan  #GI/vomiting rule out obstruction abdomen  today we will restart feeding today. GI consult requested secondary to    esophagus thickening  Will restart feeding/ low-dose 30 cc only  #Diabetes will hold off insulin start fingerstick coverage 24 hours  fingerstick acceptable  #s/p SAH  continue modafinil 10  mg    DVT PPX:    ADVANCED DIRECTIVE:    DISPOSITION: discharge planning in a.m. if remains stable

## 2019-11-15 LAB
ANION GAP SERPL CALC-SCNC: 16 MMO/L — HIGH (ref 7–14)
BUN SERPL-MCNC: 35 MG/DL — HIGH (ref 7–23)
CALCIUM SERPL-MCNC: 9.5 MG/DL — SIGNIFICANT CHANGE UP (ref 8.4–10.5)
CHLORIDE SERPL-SCNC: 98 MMOL/L — SIGNIFICANT CHANGE UP (ref 98–107)
CO2 SERPL-SCNC: 30 MMOL/L — SIGNIFICANT CHANGE UP (ref 22–31)
CREAT SERPL-MCNC: 1.02 MG/DL — SIGNIFICANT CHANGE UP (ref 0.5–1.3)
GLUCOSE BLDC GLUCOMTR-MCNC: 149 MG/DL — HIGH (ref 70–99)
GLUCOSE BLDC GLUCOMTR-MCNC: 167 MG/DL — HIGH (ref 70–99)
GLUCOSE BLDC GLUCOMTR-MCNC: 168 MG/DL — HIGH (ref 70–99)
GLUCOSE BLDC GLUCOMTR-MCNC: 239 MG/DL — HIGH (ref 70–99)
GLUCOSE SERPL-MCNC: 184 MG/DL — HIGH (ref 70–99)
HCT VFR BLD CALC: 45.2 % — SIGNIFICANT CHANGE UP (ref 39–50)
HGB BLD-MCNC: 14.5 G/DL — SIGNIFICANT CHANGE UP (ref 13–17)
MCHC RBC-ENTMCNC: 31 PG — SIGNIFICANT CHANGE UP (ref 27–34)
MCHC RBC-ENTMCNC: 32.1 % — SIGNIFICANT CHANGE UP (ref 32–36)
MCV RBC AUTO: 96.8 FL — SIGNIFICANT CHANGE UP (ref 80–100)
NRBC # FLD: 0 K/UL — SIGNIFICANT CHANGE UP (ref 0–0)
PLATELET # BLD AUTO: 312 K/UL — SIGNIFICANT CHANGE UP (ref 150–400)
PMV BLD: 11.2 FL — SIGNIFICANT CHANGE UP (ref 7–13)
POTASSIUM SERPL-MCNC: 3.4 MMOL/L — LOW (ref 3.5–5.3)
POTASSIUM SERPL-SCNC: 3.4 MMOL/L — LOW (ref 3.5–5.3)
RBC # BLD: 4.67 M/UL — SIGNIFICANT CHANGE UP (ref 4.2–5.8)
RBC # FLD: 14.4 % — SIGNIFICANT CHANGE UP (ref 10.3–14.5)
SODIUM SERPL-SCNC: 144 MMOL/L — SIGNIFICANT CHANGE UP (ref 135–145)
WBC # BLD: 8.81 K/UL — SIGNIFICANT CHANGE UP (ref 3.8–10.5)
WBC # FLD AUTO: 8.81 K/UL — SIGNIFICANT CHANGE UP (ref 3.8–10.5)

## 2019-11-15 PROCEDURE — 99232 SBSQ HOSP IP/OBS MODERATE 35: CPT | Mod: GC

## 2019-11-15 RX ORDER — PANTOPRAZOLE SODIUM 20 MG/1
40 TABLET, DELAYED RELEASE ORAL
Refills: 0 | Status: DISCONTINUED | OUTPATIENT
Start: 2019-11-15 | End: 2019-11-18

## 2019-11-15 RX ADMIN — PANTOPRAZOLE SODIUM 40 MILLIGRAM(S): 20 TABLET, DELAYED RELEASE ORAL at 22:04

## 2019-11-15 RX ADMIN — Medication 2: at 18:30

## 2019-11-15 RX ADMIN — Medication 1: at 12:19

## 2019-11-15 RX ADMIN — ATORVASTATIN CALCIUM 20 MILLIGRAM(S): 80 TABLET, FILM COATED ORAL at 22:04

## 2019-11-15 NOTE — PROGRESS NOTE ADULT - SUBJECTIVE AND OBJECTIVE BOX
Patient is a 74y old  Male who presents with a chief complaint of vomiting 3 times since 1 AM last event was coffee-ground high (14 Nov 2019 21:51)      INTERVAL HPI/OVERNIGHT EVENTS:  No acute events overnight. No reports of further emesis by nursing. Pt seen at bedside - in NAD. ROS unable to assess given nonverbal state. PPI not started yet by primary team.       MEDICATIONS  (STANDING):  atorvastatin 20 milliGRAM(s) Oral at bedtime  dextrose 5%. 1000 milliLiter(s) (50 mL/Hr) IV Continuous <Continuous>  dextrose 50% Injectable 12.5 Gram(s) IV Push once  dextrose 50% Injectable 25 Gram(s) IV Push once  dextrose 50% Injectable 25 Gram(s) IV Push once  famotidine    Tablet 20 milliGRAM(s) Oral daily  insulin lispro (HumaLOG) corrective regimen sliding scale   SubCutaneous every 6 hours    MEDICATIONS  (PRN):  dextrose 40% Gel 15 Gram(s) Oral once PRN Blood Glucose LESS THAN 70 milliGRAM(s)/deciliter  glucagon  Injectable 1 milliGRAM(s) IntraMuscular once PRN Glucose LESS THAN 70 milligrams/deciliter      Allergies    No Known Allergies    Intolerances        Vital Signs Last 24 Hrs  T(C): 36.9 (15 Nov 2019 06:05), Max: 36.9 (15 Nov 2019 06:05)  T(F): 98.4 (15 Nov 2019 06:05), Max: 98.4 (15 Nov 2019 06:05)  HR: 95 (15 Nov 2019 06:05) (95 - 105)  BP: 107/65 (15 Nov 2019 06:05) (101/72 - 111/77)  RR: 18 (15 Nov 2019 06:05) (18 - 18)  SpO2: 98% (15 Nov 2019 06:05) (97% - 98%)    PHYSICAL EXAM:  GENERAL: NAD.   CHEST/LUNG: CTAB; breath sounds diminished due to inspiratory effort.  HEART: Regular rate and rhythm; S1 and S2,  no murmurs, rubs, or gallops.  ABDOMEN: peg tube site clean and dry. Soft, not tender to palpation. No distension.  Bowel sounds present.  EXTREMITIES: contraction of extremities   SKIN: No obvious rashes or lesions.  Turgor okay.  NEURO:  nonverbal, unable to follow commands.     LABS:                        14.5   8.81  )-----------( 312      ( 15 Nov 2019 07:00 )             45.2     11-15    144  |  98  |  35<H>  ----------------------------<  184<H>  3.4<L>   |  30  |  1.02    Ca    9.5      15 Nov 2019 07:00

## 2019-11-15 NOTE — DIETITIAN INITIAL EVALUATION ADULT. - SIGNS/SYMPTOMS
predicted insufficient kcal/pro, +emesis, wt loss 12%over 2 months, severe muscle wasting & fat loss wound in scrotum

## 2019-11-15 NOTE — CHART NOTE - NSCHARTNOTEFT_GEN_A_CORE
NUTRITION SERVICES     Upon Nutritional Assessment by the Registered Dietitian your patient was determined to meet criteria/ has evidence of the following diagnosis/diagnoses:  [ ] Mild Protein Calorie Malnutrition   [ ] Moderate Protein Calorie Malnutrition   [X ] Severe Protein Calorie Malnutrition   [ ] Unspecified Protein Calorie Malnutrition   [ ] Underweight / BMI <19  [ ] Morbid Obesity / BMI >40    Findings as based on:  •  Comprehensive nutritional assessment and consultation    Please refer to Initial Dietitian Evaluation or Nutrition Follow-up via documents section of [employer] EMR for further recommendations.

## 2019-11-15 NOTE — DIETITIAN INITIAL EVALUATION ADULT. - ETIOLOGY
patient meets criteria for severe malnutrition in the context of acute on chronic illness demand for wound healing

## 2019-11-15 NOTE — DIETITIAN INITIAL EVALUATION ADULT. - OTHER INFO
75 y/o Male non-verbal, bedbound, dysphagia s/p PEG placement with multiple episodes of emesis for the past month overnight at NH ([facility]). Met with patient at bedside, no family at bedside. RDN spoke to RN. Patient was receiving 30ml [id number] bolus feeds =total of 120ml, 144kcal and 7.2gpro (insufficient kcal/pro) 2/2 concerns of emesis per nursing. No episodes reported today.  During encounter, RN reports increased bolus feeds now to 250ml-one bolus feed of 250ml provided which patient tolerated well without any GI distress (nausea, vomiting, diarrhea, constipation). Per transfer paper, patient received TWOcal HN 40cc/hr,starting at 5pm to a total volume of 800ml, 1600kcal and 66.8gpro. As patient with elevated blood glucose, would consider checking HbA1c and continue with Glucerna1.2 interim.     Per transfer paper, weight history as follows 9/3 124.2lbs 11/7 117lbs. This admission weight of 49.5kg/109lbs (11/13) noted. Indicative of significant 15lbs weight loss over 2 months. No height in chart documented this admission. Unable to locate in transfer paper. Per previous admission in flowsheet, patient with height of 63" on 7/26 noted.

## 2019-11-15 NOTE — DIETITIAN INITIAL EVALUATION ADULT. - ENTERAL
1. Recommend bolus feeds of Glucerna1.2 at 210ml [id number] =1260ml, 1512kcal and 75g pro (27kcal/kg; 1.3g/kg of IBW)

## 2019-11-15 NOTE — DIETITIAN INITIAL EVALUATION ADULT. - PERTINENT LABORATORY DATA
11-15 Na144 mmol/L Glu 184 mg/dL<H> K+ 3.4 mmol/L<L> Cr  1.02 mg/dL BUN 35 mg/dL<H> 11-12 Phos 4.6 mg/dL<H> 11-12 Alb 3.5 g/dL

## 2019-11-15 NOTE — PROGRESS NOTE ADULT - SUBJECTIVE AND OBJECTIVE BOX
Olu Anthony MD  Interventional Cardiology / Endovascular Specialist  [place] Office : [address] [place] [address] [place]. [address]  Tel:   [place] Office : [address] [facility] [place] [address]  Tel: [phone]  Cell : [phone]    HISTORY OF PRESENTING ILLNESS:    74M PMH SAH now non-verbal, bed bound, dysphagia s/p PEG p/w multiple episodes of emesis  	  MEDICATIONS:  pantoprazole   Suspension 40 milliGRAM(s) Oral two times a day  atorvastatin 20 milliGRAM(s) Oral at bedtime  dextrose 40% Gel 15 Gram(s) Oral once PRN  dextrose 50% Injectable 12.5 Gram(s) IV Push once  dextrose 50% Injectable 25 Gram(s) IV Push once  dextrose 50% Injectable 25 Gram(s) IV Push once  glucagon  Injectable 1 milliGRAM(s) IntraMuscular once PRN  insulin lispro (HumaLOG) corrective regimen sliding scale   SubCutaneous every 6 hours    dextrose 5%. 1000 milliLiter(s) IV Continuous <Continuous>      PAST MEDICAL/SURGICAL HISTORY  PAST MEDICAL & SURGICAL HISTORY:  Arthritis  Hemorrhoids, unspecified hemorrhoid type  SAH (subarachnoid hemorrhage)  BPH (benign prostatic hypertrophy)  Gastritis  Dyslipidemia  HTN (Hypertension), Benign  Diabetes  CAD (Coronary Artery Disease)  Stented coronary artery  Appendicitis      SOCIAL HISTORY: Substance Use (street drugs): ( x ) never used  (  ) other:    FAMILY HISTORY:  Family history of liver disease: Mother  from liver disease 40 yo  Cerebrovascular accident (CVA): Father 82 yo      REVIEW OF SYSTEMS:  CONSTITUTIONAL: No fever, weight loss, or fatigue  EYES: No eye pain, visual disturbances, or discharge  ENMT:  No difficulty hearing, tinnitus, vertigo; No sinus or throat pain  BREASTS: No pain, masses, or nipple discharge  GASTROINTESTINAL: No abdominal or epigastric pain. No nausea, vomiting, or hematemesis; No diarrhea or constipation. No melena or hematochezia.  GENITOURINARY: No dysuria, frequency, hematuria, or incontinence  NEUROLOGICAL: No headaches, memory loss, loss of strength, numbness, or tremors  ENDOCRINE: No heat or cold intolerance; No hair loss  MUSCULOSKELETAL: No joint pain or swelling; No muscle, back, or extremity pain  PSYCHIATRIC: No depression, anxiety, mood swings, or difficulty sleeping  HEME/LYMPH: No easy bruising, or bleeding gums  All others negative    PHYSICAL EXAM:  T(C): 36.9 (11-15-19 @ 06:05), Max: 36.9 (11-15-19 @ 06:05)  HR: 95 (11-15-19 @ 06:05) (95 - 105)  BP: 107/65 (11-15-19 @ 06:05) (101/72 - 111/77)  RR: 18 (11-15-19 @ 06:05) (18 - 18)  SpO2: 98% (11-15-19 @ 06:05) (97% - 98%)  Wt(kg): --  I&O's Summary        GENERAL: NAD, nonverbal   ENMT: No tonsillar erythema, exudates, or enlargement; Moist mucous membranes  Cardiovascular: Normal S1 S2, No JVD, No murmurs, No edema  Respiratory: decreased b/s B/L   Gastrointestinal:  Soft, Non-tender, + BS	, peg +   Extremities:  No clubbing, cyanosis or edema  LYMPH: No lymphadenopathy noted                                14.5   8.81  )-----------( 312      ( 15 Nov 2019 07:00 )             45.2     -15    144  |  98  |  35<H>  ----------------------------<  184<H>  3.4<L>   |  30  |  1.02    Ca    9.5      15 Nov 2019 07:00      proBNP:   Lipid Profile:   HgA1c:   TSH:     Consultant(s) Notes Reviewed:  [x ] YES  [ ] NO    Care Discussed with Consultants/Other Providers [ x] YES  [ ] NO    Imaging Personally Reviewed independently:  [x] YES  [ ] NO    All labs, radiologic studies, vitals, orders and medications list reviewed. Patient is seen and examined at bedside. Case discussed with medical team.

## 2019-11-15 NOTE — PROGRESS NOTE ADULT - SUBJECTIVE AND OBJECTIVE BOX
CHIEF COMPLAINT:  patient was seen earlier today laying in the bed his wife at the bedside no further vomiting but is only on 30 cc feeding every 6 hours as per nursing team.  Discussed with medical team to increase his feeding tube to 250 cc/h every 6 hour and if he tolerated to be discharged  SUBJECTIVE:     REVIEW OF SYSTEMS:patient is aphasic    CONSTITUTIONAL: (  )  weakness,  (  ) fevers or chills  EYES/ENT: (  )visual changes;     NECK: (  ) pain or stiffness  RESPIRATORY:   (  )cough, wheezing, hemoptysis;  (  ) shortness of breath  CARDIOVASCULAR:  (  )chest pain or palpitations  GASTROINTESTINAL:   (  )abdominal or epigastric pain.  (  ) nausea, vomiting, or hematemesis;   (   ) diarrhea or constipation.   GENITOURINARY:   (    ) dysuria, frequency or hematuria  NEUROLOGICAL:  (   ) numbness or weakness   All other review of systems is negative unless indicated above    Vital Signs Last 24 Hrs  T(C): 36.3 (15 Nov 2019 13:45), Max: 36.9 (15 Nov 2019 06:05)  T(F): 97.3 (15 Nov 2019 13:45), Max: 98.4 (15 Nov 2019 06:05)  HR: 100 (15 Nov 2019 13:45) (95 - 105)  BP: 128/86 (15 Nov 2019 13:45) (107/65 - 128/86)  BP(mean): --  RR: 18 (15 Nov 2019 13:45) (18 - 18)  SpO2: 98% (15 Nov 2019 13:45) (97% - 98%)    I&O's Summary      CAPILLARY BLOOD GLUCOSE      POCT Blood Glucose.: 239 mg/dL (15 Nov 2019 18:06)  POCT Blood Glucose.: 168 mg/dL (15 Nov 2019 12:14)  POCT Blood Glucose.: 149 mg/dL (15 Nov 2019 04:49)  POCT Blood Glucose.: 144 mg/dL (14 Nov 2019 22:13)      PHYSICAL EXAM:    Constitutional:  (  x ) NAD,   (   )awake and alert  HEENT: PERR, EOMI,    Neck: Soft and supple, No LAD, No JVD  Respiratory:  (  x  Breath sounds are clear bilaterally,    (   ) wheezing, rales or rhonchi  Cardiovascular:     ( x  )S1 and S2, regular rate and rhythm, no Murmurs, gallops or rubs  Gastrointestinal:  (  x )Bowel Sounds present, soft,   ( x )nontender, nondistended,  +  PEG    Extremities:    (  ) peripheral edema  Vascular: 2+ peripheral pulses  Neurological:    (  x  )A/O x ?,   (  ) focal deficits  Musculoskeletal:    (   )  normal strength b/l upper  (     ) normal  lower extremities  Skin: No rashes    MEDICATIONS:  MEDICATIONS  (STANDING):  atorvastatin 20 milliGRAM(s) Oral at bedtime  dextrose 5%. 1000 milliLiter(s) (50 mL/Hr) IV Continuous <Continuous>  dextrose 50% Injectable 12.5 Gram(s) IV Push once  dextrose 50% Injectable 25 Gram(s) IV Push once  dextrose 50% Injectable 25 Gram(s) IV Push once  insulin lispro (HumaLOG) corrective regimen sliding scale   SubCutaneous every 6 hours  pantoprazole   Suspension 40 milliGRAM(s) Oral two times a day      LABS: All Labs Reviewed:                        14.5   8.81  )-----------( 312      ( 15 Nov 2019 07:00 )             45.2     11-15    144  |  98  |  35<H>  ----------------------------<  184<H>  3.4<L>   |  30  |  1.02    Ca    9.5      15 Nov 2019 07:00            Blood Culture:   Urine Culture      RADIOLOGY/EKG:    ASSESSMENT AND PLAN:  74M PMH SAH now non-verbal, bed bound, dysphagia s/p PEG p/w multiple episodes of emesis overnight. Per staff pt has been having nightly emesis for the past month, dietician has been adjusting his tube feed rates. Now takes glucerna 40cc/hr with 50cc flushes. Report from nursing facility states that pt had coffee ground emesis, nurse at facility is unsure if coffee ground emesis was present.  assessment and plan  #GI/vomiting rule out obstruction abdomen  today we will restart feeding today. GI consult secondary to    esophagus thickening noted no invasive test to be done continue PPI twice a day  Will restart feeding/ low-dose 30 cc only  #Diabetes will hold off insulin start fingerstick coverage 24 hours  fingerstick acceptable  #s/p SAH  continue modafinil 10  mg    DVT PPX:    ADVANCED DIRECTIVE:    DISPOSITION: discussed with his wife and team in detail possible discharge today or in a.m.

## 2019-11-15 NOTE — DIETITIAN INITIAL EVALUATION ADULT. - PHYSICAL APPEARANCE
other (specify) Thin, unable to participate in Nutrition Focus Physical Exam, noted with visual signs of severe temporal and clavicle muscle wasting and severe orbital, buccal fat loss.  No edema noted. Wound-scrotum per flowsheet.

## 2019-11-15 NOTE — PROGRESS NOTE ADULT - ASSESSMENT
74M PMH SAH now non-verbal, bed bound, dysphagia s/p PEG p/w multiple episodes of emesis overnight. GI consulted for coffee-ground emesis.    IMPRESSION:  #emesis: Reports of coffee-ground emesis from nursing facility. However, peg tube aspiration was clear with specks of hematin. Pt is currently stable without signs of active bleeding. HDS and stable hgb.     #esophageal wall thickening: Given history of gastritis and acid reflux, this is likely esophagitis from GERD. Low concern for malignancy or infection.       RECOMMENDATION:  - conservative management - no need for EGD at this time  - please start PPI BID      Karolina Carrera PGY1

## 2019-11-15 NOTE — DIETITIAN INITIAL EVALUATION ADULT. - PERTINENT MEDS FT
MEDICATIONS  (STANDING):  atorvastatin 20 milliGRAM(s) Oral at bedtime  dextrose 5%. 1000 milliLiter(s) (50 mL/Hr) IV Continuous <Continuous>  dextrose 50% Injectable 12.5 Gram(s) IV Push once  dextrose 50% Injectable 25 Gram(s) IV Push once  dextrose 50% Injectable 25 Gram(s) IV Push once  insulin lispro (HumaLOG) corrective regimen sliding scale   SubCutaneous every 6 hours  pantoprazole   Suspension 40 milliGRAM(s) Oral two times a day    MEDICATIONS  (PRN):  dextrose 40% Gel 15 Gram(s) Oral once PRN Blood Glucose LESS THAN 70 milliGRAM(s)/deciliter  glucagon  Injectable 1 milliGRAM(s) IntraMuscular once PRN Glucose LESS THAN 70 milligrams/deciliter

## 2019-11-15 NOTE — DIETITIAN INITIAL EVALUATION ADULT. - ADD RECOMMEND
1. Monitor weights, labs, BM's, skin integrity, tolerance of EN. 2. Check PEG site if continues with emesis 3. Further adjustments to rate/volume/duration/free water provision of enteral feeds dependant on long term monitoring of patient's tolerance, weight trends and needs.

## 2019-11-16 LAB
ANION GAP SERPL CALC-SCNC: 16 MMO/L — HIGH (ref 7–14)
BUN SERPL-MCNC: 40 MG/DL — HIGH (ref 7–23)
CALCIUM SERPL-MCNC: 9.5 MG/DL — SIGNIFICANT CHANGE UP (ref 8.4–10.5)
CHLORIDE SERPL-SCNC: 100 MMOL/L — SIGNIFICANT CHANGE UP (ref 98–107)
CO2 SERPL-SCNC: 28 MMOL/L — SIGNIFICANT CHANGE UP (ref 22–31)
CREAT SERPL-MCNC: 0.87 MG/DL — SIGNIFICANT CHANGE UP (ref 0.5–1.3)
GLUCOSE BLDC GLUCOMTR-MCNC: 239 MG/DL — HIGH (ref 70–99)
GLUCOSE BLDC GLUCOMTR-MCNC: 259 MG/DL — HIGH (ref 70–99)
GLUCOSE BLDC GLUCOMTR-MCNC: 263 MG/DL — HIGH (ref 70–99)
GLUCOSE BLDC GLUCOMTR-MCNC: 277 MG/DL — HIGH (ref 70–99)
GLUCOSE BLDC GLUCOMTR-MCNC: 286 MG/DL — HIGH (ref 70–99)
GLUCOSE BLDC GLUCOMTR-MCNC: 297 MG/DL — HIGH (ref 70–99)
GLUCOSE SERPL-MCNC: 270 MG/DL — HIGH (ref 70–99)
HCT VFR BLD CALC: 46.2 % — SIGNIFICANT CHANGE UP (ref 39–50)
HGB BLD-MCNC: 14.6 G/DL — SIGNIFICANT CHANGE UP (ref 13–17)
MCHC RBC-ENTMCNC: 30.9 PG — SIGNIFICANT CHANGE UP (ref 27–34)
MCHC RBC-ENTMCNC: 31.6 % — LOW (ref 32–36)
MCV RBC AUTO: 97.9 FL — SIGNIFICANT CHANGE UP (ref 80–100)
NRBC # FLD: 0 K/UL — SIGNIFICANT CHANGE UP (ref 0–0)
PLATELET # BLD AUTO: 322 K/UL — SIGNIFICANT CHANGE UP (ref 150–400)
PMV BLD: 11.2 FL — SIGNIFICANT CHANGE UP (ref 7–13)
POTASSIUM SERPL-MCNC: 4.1 MMOL/L — SIGNIFICANT CHANGE UP (ref 3.5–5.3)
POTASSIUM SERPL-SCNC: 4.1 MMOL/L — SIGNIFICANT CHANGE UP (ref 3.5–5.3)
RBC # BLD: 4.72 M/UL — SIGNIFICANT CHANGE UP (ref 4.2–5.8)
RBC # FLD: 14.3 % — SIGNIFICANT CHANGE UP (ref 10.3–14.5)
SODIUM SERPL-SCNC: 144 MMOL/L — SIGNIFICANT CHANGE UP (ref 135–145)
WBC # BLD: 7.78 K/UL — SIGNIFICANT CHANGE UP (ref 3.8–10.5)
WBC # FLD AUTO: 7.78 K/UL — SIGNIFICANT CHANGE UP (ref 3.8–10.5)

## 2019-11-16 RX ADMIN — Medication 3: at 18:22

## 2019-11-16 RX ADMIN — Medication 3: at 22:38

## 2019-11-16 RX ADMIN — Medication 3: at 00:49

## 2019-11-16 RX ADMIN — Medication 2: at 12:41

## 2019-11-16 RX ADMIN — ATORVASTATIN CALCIUM 20 MILLIGRAM(S): 80 TABLET, FILM COATED ORAL at 22:37

## 2019-11-16 RX ADMIN — Medication 3: at 06:09

## 2019-11-16 RX ADMIN — PANTOPRAZOLE SODIUM 40 MILLIGRAM(S): 20 TABLET, DELAYED RELEASE ORAL at 22:37

## 2019-11-16 RX ADMIN — PANTOPRAZOLE SODIUM 40 MILLIGRAM(S): 20 TABLET, DELAYED RELEASE ORAL at 11:49

## 2019-11-16 NOTE — PROGRESS NOTE ADULT - SUBJECTIVE AND OBJECTIVE BOX
CHIEF COMPLAINT:   patient seems to be lethargic tonight this was also noticed by his wife too  as per nursing she was here earlier today  SUBJECTIVE:     REVIEW OF SYSTEMS:xpatient nonverbal baseline    CONSTITUTIONAL: ( x )  weakness,  (  ) fevers or chills  EYES/ENT: (  )visual changes;     NECK: (  ) pain or stiffness  RESPIRATORY:   (  )cough, wheezing, hemoptysis;  (  ) shortness of breath  CARDIOVASCULAR:  (  )chest pain or palpitations  GASTROINTESTINAL:   (  )abdominal or epigastric pain.  (  ) nausea, vomiting, or hematemesis;   (   ) diarrhea or constipation.   GENITOURINARY:   (    ) dysuria, frequency or hematuria  NEUROLOGICAL:  (   ) numbness or weakness   All other review of systems is negative unless indicated above    Vital Signs Last 24 Hrs  T(C): 37.1 (16 Nov 2019 21:51), Max: 37.1 (16 Nov 2019 21:51)  T(F): 98.7 (16 Nov 2019 21:51), Max: 98.7 (16 Nov 2019 21:51)  HR: 99 (16 Nov 2019 21:51) (99 - 107)  BP: 132/75 (16 Nov 2019 21:51) (121/70 - 132/75)  BP(mean): --  RR: 18 (16 Nov 2019 21:51) (18 - 18)  SpO2: 99% (16 Nov 2019 21:51) (97% - 99%)    I&O's Summary      CAPILLARY BLOOD GLUCOSE      POCT Blood Glucose.: 297 mg/dL (16 Nov 2019 22:05)  POCT Blood Glucose.: 277 mg/dL (16 Nov 2019 18:12)  POCT Blood Glucose.: 239 mg/dL (16 Nov 2019 12:27)  POCT Blood Glucose.: 259 mg/dL (16 Nov 2019 09:01)  POCT Blood Glucose.: 263 mg/dL (16 Nov 2019 06:01)  POCT Blood Glucose.: 286 mg/dL (16 Nov 2019 00:36)      PHYSICAL EXAM:    Constitutional:  (  x ) NAD,   (   )awake and alert  HEENT: PERR, EOMI,    Neck: Soft and supple, No LAD, No JVD  Respiratory:  (  x  Breath sounds are clear bilaterally,    (   ) wheezing, rales or rhonchi  Cardiovascular:     ( x  )S1 and S2, regular rate and rhythm, no Murmurs, gallops or rubs  Gastrointestinal:  (  x )Bowel Sounds present, soft,   ( x              )nontender, nondistended,    Extremities:    (  ) peripheral edema  Vascular: 2+ peripheral pulses  Neurological:    (    )A/O x 3,   (  ) focal deficits  Musculoskeletal:    (   )  normal strength b/l upper  (     ) normal  lower extremities  Skin: No rashes    MEDICATIONS:  MEDICATIONS  (STANDING):  atorvastatin 20 milliGRAM(s) Oral at bedtime  dextrose 5%. 1000 milliLiter(s) (50 mL/Hr) IV Continuous <Continuous>  dextrose 50% Injectable 12.5 Gram(s) IV Push once  dextrose 50% Injectable 25 Gram(s) IV Push once  dextrose 50% Injectable 25 Gram(s) IV Push once  insulin lispro (HumaLOG) corrective regimen sliding scale   SubCutaneous every 6 hours  pantoprazole   Suspension 40 milliGRAM(s) Oral two times a day      LABS: All Labs Reviewed:                        14.6   7.78  )-----------( 322      ( 16 Nov 2019 05:25 )             46.2     11-16    144  |  100  |  40<H>  ----------------------------<  270<H>  4.1   |  28  |  0.87    Ca    9.5      16 Nov 2019 05:25            Blood Culture:   Urine Culture      RADIOLOGY/EKG:    ASSESSMENT AND PLAN:  patient condition is stable tolerating 210  cc every 4 hours without residual or vomiting patient is stable for discharge.  We will monitor him in a.m. about this mental status changes no sign of sepsis at present time  DVT PPX:    ADVANCED DIRECTIVE:    DISPOSITION:

## 2019-11-17 ENCOUNTER — TRANSCRIPTION ENCOUNTER (OUTPATIENT)
Age: 74
End: 2019-11-17

## 2019-11-17 LAB
ANION GAP SERPL CALC-SCNC: 14 MMO/L — SIGNIFICANT CHANGE UP (ref 7–14)
BUN SERPL-MCNC: 29 MG/DL — HIGH (ref 7–23)
CALCIUM SERPL-MCNC: 9.2 MG/DL — SIGNIFICANT CHANGE UP (ref 8.4–10.5)
CHLORIDE SERPL-SCNC: 101 MMOL/L — SIGNIFICANT CHANGE UP (ref 98–107)
CO2 SERPL-SCNC: 28 MMOL/L — SIGNIFICANT CHANGE UP (ref 22–31)
CREAT SERPL-MCNC: 0.8 MG/DL — SIGNIFICANT CHANGE UP (ref 0.5–1.3)
GLUCOSE BLDC GLUCOMTR-MCNC: 217 MG/DL — HIGH (ref 70–99)
GLUCOSE BLDC GLUCOMTR-MCNC: 229 MG/DL — HIGH (ref 70–99)
GLUCOSE BLDC GLUCOMTR-MCNC: 249 MG/DL — HIGH (ref 70–99)
GLUCOSE BLDC GLUCOMTR-MCNC: 277 MG/DL — HIGH (ref 70–99)
GLUCOSE SERPL-MCNC: 274 MG/DL — HIGH (ref 70–99)
HCT VFR BLD CALC: 44.4 % — SIGNIFICANT CHANGE UP (ref 39–50)
HGB BLD-MCNC: 13.5 G/DL — SIGNIFICANT CHANGE UP (ref 13–17)
MCHC RBC-ENTMCNC: 30.4 % — LOW (ref 32–36)
MCHC RBC-ENTMCNC: 30.5 PG — SIGNIFICANT CHANGE UP (ref 27–34)
MCV RBC AUTO: 100.5 FL — HIGH (ref 80–100)
NRBC # FLD: 0 K/UL — SIGNIFICANT CHANGE UP (ref 0–0)
PLATELET # BLD AUTO: 289 K/UL — SIGNIFICANT CHANGE UP (ref 150–400)
PMV BLD: 11.6 FL — SIGNIFICANT CHANGE UP (ref 7–13)
POTASSIUM SERPL-MCNC: 3.9 MMOL/L — SIGNIFICANT CHANGE UP (ref 3.5–5.3)
POTASSIUM SERPL-SCNC: 3.9 MMOL/L — SIGNIFICANT CHANGE UP (ref 3.5–5.3)
RBC # BLD: 4.42 M/UL — SIGNIFICANT CHANGE UP (ref 4.2–5.8)
RBC # FLD: 14.4 % — SIGNIFICANT CHANGE UP (ref 10.3–14.5)
SODIUM SERPL-SCNC: 143 MMOL/L — SIGNIFICANT CHANGE UP (ref 135–145)
WBC # BLD: 6.61 K/UL — SIGNIFICANT CHANGE UP (ref 3.8–10.5)
WBC # FLD AUTO: 6.61 K/UL — SIGNIFICANT CHANGE UP (ref 3.8–10.5)

## 2019-11-17 RX ORDER — PANTOPRAZOLE SODIUM 20 MG/1
40 TABLET, DELAYED RELEASE ORAL
Qty: 0 | Refills: 0 | DISCHARGE
Start: 2019-11-17

## 2019-11-17 RX ORDER — POLYETHYLENE GLYCOL 3350 17 G/17G
17 POWDER, FOR SOLUTION ORAL
Refills: 0 | Status: DISCONTINUED | OUTPATIENT
Start: 2019-11-17 | End: 2019-11-18

## 2019-11-17 RX ADMIN — ATORVASTATIN CALCIUM 20 MILLIGRAM(S): 80 TABLET, FILM COATED ORAL at 23:15

## 2019-11-17 RX ADMIN — PANTOPRAZOLE SODIUM 40 MILLIGRAM(S): 20 TABLET, DELAYED RELEASE ORAL at 23:14

## 2019-11-17 RX ADMIN — Medication 2: at 23:14

## 2019-11-17 RX ADMIN — Medication 2: at 05:06

## 2019-11-17 RX ADMIN — Medication 3: at 18:32

## 2019-11-17 RX ADMIN — Medication 2: at 12:48

## 2019-11-17 RX ADMIN — POLYETHYLENE GLYCOL 3350 17 GRAM(S): 17 POWDER, FOR SOLUTION ORAL at 18:32

## 2019-11-17 RX ADMIN — PANTOPRAZOLE SODIUM 40 MILLIGRAM(S): 20 TABLET, DELAYED RELEASE ORAL at 12:48

## 2019-11-17 NOTE — DISCHARGE NOTE PROVIDER - NSDCMRMEDTOKEN_GEN_ALL_CORE_FT
acetaminophen 325 mg oral tablet: 2 tab(s) gastric tube twice a day   Afluria intramuscular suspension: 0.5 milliliter(s) intramuscular once (Received on 10/29/2019)  atorvastatin 20 mg oral tablet: 1 tab(s) by gastrostomy tube once a day (at bedtime)  docusate sodium 50 mg/15 mL oral syrup: 50 milligram(s) by gastrostomy tube once a day  Glucophage 850 mg oral tablet: 1 tab(s) by gastrostomy tube once a day (in the morning)  Levemir 100 units/mL subcutaneous solution: 10 unit(s) subcutaneous once a day (at bedtime)  NovoLIN N 100 units/mL subcutaneous suspension: 20 unit(s) subcutaneous 2 times a day  pantoprazole 40 mg oral granule, delayed release: 40 milligram(s) orally 2 times a day  raNITIdine 150 mg oral tablet: 1 tab(s) gastric tube once a day  sennosides-docusate sodium syrup 50mg/5mL: 5 milliliter(s) gastric tube once at bedtime  Silvadene 1% topical cream: Apply topically to affected area 2 times a day acetaminophen 325 mg oral tablet: 2 tab(s) gastric tube twice a day   atorvastatin 20 mg oral tablet: 1 tab(s) by gastrostomy tube once a day (at bedtime)  Glucophage 850 mg oral tablet: 1 tab(s) by gastrostomy tube once a day (in the morning)  Levemir 100 units/mL subcutaneous solution: 10 unit(s) subcutaneous once a day (at bedtime)  NovoLIN N 100 units/mL subcutaneous suspension: 20 unit(s) subcutaneous 2 times a day  pantoprazole 40 mg oral granule, delayed release: 40 milligram(s) orally 2 times a day  polyethylene glycol 3350 oral powder for reconstitution: 17 gram(s) orally 2 times a day  Silvadene 1% topical cream: Apply topically to affected area 2 times a day

## 2019-11-17 NOTE — DISCHARGE NOTE PROVIDER - HOSPITAL COURSE
74M PMH SAH now non-verbal, bed bound, dysphagia s/p PEG p/w multiple episodes of emesis overnight. Per staff pt has been having nightly emesis for the past month, dietician has been adjusting his tube feed rates. Now takes glucerna 40cc/hr with 50cc flushes. Report from nursing facility states that pt had coffee ground emesis.        Hospital Course:        GI/vomiting     - Ab XR, CT Ab neg for SBO     - surgery ->G tube to gravity    - GI cs- conservative management, no EGD at this time        Chronic SAH    - CTH c/w NPH,     - Neuro consulted- no intervention        Diabetes     - ISS    - POCT FS q 6hrs while NPO         Chronic CVA     -no ASA sec to h/o SAH         Dispo- Stable for discharge back to nursing home under Dr. Cartwright care. 74M PMH SAH now non-verbal, bed bound, dysphagia s/p PEG p/w multiple episodes of emesis overnight. Per staff pt has been having nightly emesis for the past month, dietician has been adjusting his tube feed rates. Now takes glucerna 40cc/hr with 50cc flushes. Report from nursing facility states that pt had coffee ground emesis.        Hospital Course:        GI/vomiting     - Ab XR, CT Ab neg for SBO     - surgery ->G tube to gravity    - GI cs- conservative management, no EGD at this time        Chronic SAH    - CTH c/w NPH,     - Neuro consulted- no intervention        Diabetes     - ISS    - POCT FS q 6hrs while NPO         Chronic CVA     -no ASA sec to h/o SAH         Dispo- Stable for discharge back to nursing home under Dr. Bang care.

## 2019-11-17 NOTE — CHART NOTE - NSCHARTNOTEFT_GEN_A_CORE
CHIEF COMPLAINT:  patient condition is stable today is more alert than yesterday wife at the bedside tolerating feeding without residual  SUBJECTIVE:     REVIEW OF SYSTEMS: a phasic    CONSTITUTIONAL: (  )  weakness,  (  ) fevers or chills  EYES/ENT: (  )visual changes;     NECK: (  ) pain or stiffness  RESPIRATORY:   (  )cough, wheezing, hemoptysis;  (  ) shortness of breath  CARDIOVASCULAR:  (  )chest pain or palpitations  GASTROINTESTINAL:   (  )abdominal or epigastric pain.  (  ) nausea, vomiting, or hematemesis;   (   ) diarrhea or constipation.   GENITOURINARY:   (    ) dysuria, frequency or hematuria  NEUROLOGICAL:  (   ) numbness or weakness   All other review of systems is negative unless indicated above    Vital Signs Last 24 Hrs  T(C): 36.5 (17 Nov 2019 21:38), Max: 36.5 (17 Nov 2019 21:38)  T(F): 97.7 (17 Nov 2019 21:38), Max: 97.7 (17 Nov 2019 21:38)  HR: 73 (17 Nov 2019 21:38) (73 - 92)  BP: 140/83 (17 Nov 2019 21:38) (127/76 - 140/83)  BP(mean): --  RR: 18 (17 Nov 2019 21:38) (16 - 18)  SpO2: 97% (17 Nov 2019 21:38) (96% - 98%)    I&O's Summary    16 Nov 2019 07:01  -  17 Nov 2019 07:00  --------------------------------------------------------  IN: 0 mL / OUT: 300 mL / NET: -300 mL        CAPILLARY BLOOD GLUCOSE      POCT Blood Glucose.: 277 mg/dL (17 Nov 2019 17:59)  POCT Blood Glucose.: 229 mg/dL (17 Nov 2019 12:01)  POCT Blood Glucose.: 217 mg/dL (17 Nov 2019 05:01)      PHYSICAL EXAM:    Constitutional:  (  x ) NAD,   (   )awake and alert  HEENT: PERR, EOMI,    Neck: Soft and supple, No LAD, No JVD  Respiratory:  (  x  Breath sounds are clear bilaterally,    (   ) wheezing, rales or rhonchi  Cardiovascular:     (  x )S1 and S2, regular rate and rhythm, no Murmurs, gallops or rubs  Gastrointestinal:  ( x  )Bowel Sounds present, soft,   (  )nontender, nondistended,    Extremities:    (  ) peripheral edema  Vascular: 2+ peripheral pulses  Neurological:    (   x )A/O x 0,   (  ) focal deficits  Musculoskeletal:    (   )  normal strength b/l upper  (     ) normal  lower extremities  Skin: No rashes    MEDICATIONS:  MEDICATIONS  (STANDING):  atorvastatin 20 milliGRAM(s) Oral at bedtime  dextrose 5%. 1000 milliLiter(s) (50 mL/Hr) IV Continuous <Continuous>  dextrose 50% Injectable 12.5 Gram(s) IV Push once  dextrose 50% Injectable 25 Gram(s) IV Push once  dextrose 50% Injectable 25 Gram(s) IV Push once  insulin lispro (HumaLOG) corrective regimen sliding scale   SubCutaneous every 6 hours  pantoprazole   Suspension 40 milliGRAM(s) Oral two times a day  polyethylene glycol 3350 17 Gram(s) Oral two times a day      LABS: All Labs Reviewed:                        13.5   6.61  )-----------( 289      ( 17 Nov 2019 06:05 )             44.4     11-17    143  |  101  |  29<H>  ----------------------------<  274<H>  3.9   |  28  |  0.80    Ca    9.2      17 Nov 2019 06:05            Blood Culture:   Urine Culture      RADIOLOGY/EKG:    ASSESSMENT AND PLAN:   patient medically stable for discharge discussed with the nursing and medical team and his wife in detail is ready for discharge back to the facility continue bolus feeding 250 cc every 6 hours and continue PPI twice a day secondary to esophageal lesion  DVT PPX:    ADVANCED DIRECTIVE:    DISPOSITION:

## 2019-11-17 NOTE — DISCHARGE NOTE PROVIDER - CARE PROVIDER_API CALL
Jennifer Bang)  Medicine  [address] [place] [address]  Phone: [phone]  Fax: [phone]  Follow Up Time:

## 2019-11-17 NOTE — DISCHARGE NOTE PROVIDER - NSDCCPCAREPLAN_GEN_ALL_CORE_FT
PRINCIPAL DISCHARGE DIAGNOSIS  Diagnosis: Vomiting  Assessment and Plan of Treatment: No bowel obstruction noted on Cat Scan of abdomen.  You are now tolerating PEG tube feedings without any vomiting. PRINCIPAL DISCHARGE DIAGNOSIS  Diagnosis: Vomiting  Assessment and Plan of Treatment: No bowel obstruction noted on Cat Scan of abdomen.  You are now tolerating PEG tube feedings without any vomiting. Continue protonix as directed. Continue miralax as directed. PRINCIPAL DISCHARGE DIAGNOSIS  Diagnosis: Vomiting  Assessment and Plan of Treatment: No bowel obstruction noted on Cat Scan of abdomen.  You are now tolerating PEG tube feedings without any vomiting. Continue protonix as directed. Continue miralax as directed.      SECONDARY DISCHARGE DIAGNOSES  Diagnosis: DM (diabetes mellitus)  Assessment and Plan of Treatment: Continue consistent carbohydrate diet.  Monitor blood glucose levels throughout the day before meals and at bedtime.  Record blood sugars and bring to outpatient providers appointment in order to be reviewed by your doctor for management modifications.  Be aware of diabetes management symptoms including feeling cool and clammy may be related to low glucose levels.  Feeling hot and dry may indicate high glucose levels.  If  you feel these symptoms, check your blood sugar.  Make regular podiatry appointments in order to have feet checked for wounds and toe nails cut by a doctor to prevent infections.

## 2019-11-17 NOTE — PROGRESS NOTE ADULT - SUBJECTIVE AND OBJECTIVE BOX
Olu Anthony MD  Interventional Cardiology / Advance Heart Failure and Cardiac Transplant Specialist  [place] Office : [address] [place] [address] [place] [address]  Tel:   [place] Office : [address] [facility] [place] [address]  Tel: [phone]  Cell : [phone]    74M PMH SAH now non-verbal, bed bound, dysphagia s/p PEG p/w multiple episodes of emesis  	  MEDICATIONS:          pantoprazole   Suspension 40 milliGRAM(s) Oral two times a day    atorvastatin 20 milliGRAM(s) Oral at bedtime  dextrose 40% Gel 15 Gram(s) Oral once PRN  dextrose 50% Injectable 12.5 Gram(s) IV Push once  dextrose 50% Injectable 25 Gram(s) IV Push once  dextrose 50% Injectable 25 Gram(s) IV Push once  glucagon  Injectable 1 milliGRAM(s) IntraMuscular once PRN  insulin lispro (HumaLOG) corrective regimen sliding scale   SubCutaneous every 6 hours    dextrose 5%. 1000 milliLiter(s) IV Continuous <Continuous>      PAST MEDICAL/SURGICAL HISTORY  PAST MEDICAL & SURGICAL HISTORY:  Arthritis  Hemorrhoids, unspecified hemorrhoid type  SAH (subarachnoid hemorrhage)  BPH (benign prostatic hypertrophy)  Gastritis  Dyslipidemia  HTN (Hypertension), Benign  Diabetes  CAD (Coronary Artery Disease)  Stented coronary artery  Appendicitis      SOCIAL HISTORY: Substance Use (street drugs): ( x ) never used  (  ) other:    FAMILY HISTORY:  Family history of liver disease: Mother  from liver disease 40 yo  Cerebrovascular accident (CVA): Father 82 yo      REVIEW OF SYSTEMS:  nonverbal    PHYSICAL EXAM:  T(C): 36.4 (19 @ 13:30), Max: 37.1 (19 @ 21:51)  HR: 88 (19 @ 13:30) (88 - 99)  BP: 127/76 (19 @ 13:30) (127/76 - 132/75)  RR: 16 (19 @ 13:30) (16 - 18)  SpO2: 98% (19 @ 13:30) (96% - 99%)  Wt(kg): --  I&O's Summary    2019 07:01  -  2019 07:00  --------------------------------------------------------  IN: 0 mL / OUT: 300 mL / NET: -300 mL          GENERAL: nonverbal  EYES: EOMI, PERRLA, conjunctiva and sclera clear  ENMT: No tonsillar erythema,  Cardiovascular: Normal S1 S2, No JVD, No murmurs, No edema  Respiratory: Lungs clear to auscultation	  Gastrointestinal:  Soft, Non-tender, + BS	  Extremities: Normal range of motion, No clubbing, cyanosis or edema  LYMPH: No lymphadenopathy noted                                  13.5   6.61  )-----------( 289      ( 2019 06:05 )             44.4     11-17    143  |  101  |  29<H>  ----------------------------<  274<H>  3.9   |  28  |  0.80    Ca    9.2      2019 06:05      proBNP:   Lipid Profile:   HgA1c:   TSH:     Consultant(s) Notes Reviewed:  [x ] YES  [ ] NO    Care Discussed with Consultants/Other Providers [ x] YES  [ ] NO    Imaging Personally Reviewed independently:  [x] YES  [ ] NO    All labs, radiologic studies, vitals, orders and medications list reviewed. Patient is seen and examined at bedside. Case discussed with medical team.

## 2019-11-18 ENCOUNTER — TRANSCRIPTION ENCOUNTER (OUTPATIENT)
Age: 74
End: 2019-11-18

## 2019-11-18 VITALS
TEMPERATURE: 98 F | DIASTOLIC BLOOD PRESSURE: 78 MMHG | RESPIRATION RATE: 18 BRPM | SYSTOLIC BLOOD PRESSURE: 126 MMHG | OXYGEN SATURATION: 96 % | HEART RATE: 95 BPM

## 2019-11-18 LAB
ANION GAP SERPL CALC-SCNC: 13 MMO/L — SIGNIFICANT CHANGE UP (ref 7–14)
BUN SERPL-MCNC: 26 MG/DL — HIGH (ref 7–23)
CALCIUM SERPL-MCNC: 9 MG/DL — SIGNIFICANT CHANGE UP (ref 8.4–10.5)
CHLORIDE SERPL-SCNC: 102 MMOL/L — SIGNIFICANT CHANGE UP (ref 98–107)
CO2 SERPL-SCNC: 25 MMOL/L — SIGNIFICANT CHANGE UP (ref 22–31)
CREAT SERPL-MCNC: 0.76 MG/DL — SIGNIFICANT CHANGE UP (ref 0.5–1.3)
GLUCOSE BLDC GLUCOMTR-MCNC: 265 MG/DL — HIGH (ref 70–99)
GLUCOSE BLDC GLUCOMTR-MCNC: 342 MG/DL — HIGH (ref 70–99)
GLUCOSE SERPL-MCNC: 297 MG/DL — HIGH (ref 70–99)
HCT VFR BLD CALC: 41.5 % — SIGNIFICANT CHANGE UP (ref 39–50)
HGB BLD-MCNC: 13.4 G/DL — SIGNIFICANT CHANGE UP (ref 13–17)
MCHC RBC-ENTMCNC: 31.6 PG — SIGNIFICANT CHANGE UP (ref 27–34)
MCHC RBC-ENTMCNC: 32.3 % — SIGNIFICANT CHANGE UP (ref 32–36)
MCV RBC AUTO: 97.9 FL — SIGNIFICANT CHANGE UP (ref 80–100)
NRBC # FLD: 0 K/UL — SIGNIFICANT CHANGE UP (ref 0–0)
PLATELET # BLD AUTO: 282 K/UL — SIGNIFICANT CHANGE UP (ref 150–400)
PMV BLD: 11.8 FL — SIGNIFICANT CHANGE UP (ref 7–13)
POTASSIUM SERPL-MCNC: 4.4 MMOL/L — SIGNIFICANT CHANGE UP (ref 3.5–5.3)
POTASSIUM SERPL-SCNC: 4.4 MMOL/L — SIGNIFICANT CHANGE UP (ref 3.5–5.3)
RBC # BLD: 4.24 M/UL — SIGNIFICANT CHANGE UP (ref 4.2–5.8)
RBC # FLD: 14.2 % — SIGNIFICANT CHANGE UP (ref 10.3–14.5)
SODIUM SERPL-SCNC: 140 MMOL/L — SIGNIFICANT CHANGE UP (ref 135–145)
WBC # BLD: 5.87 K/UL — SIGNIFICANT CHANGE UP (ref 3.8–10.5)
WBC # FLD AUTO: 5.87 K/UL — SIGNIFICANT CHANGE UP (ref 3.8–10.5)

## 2019-11-18 RX ORDER — RANITIDINE HYDROCHLORIDE 150 MG/1
1 TABLET, FILM COATED ORAL
Qty: 0 | Refills: 0 | DISCHARGE

## 2019-11-18 RX ORDER — POLYETHYLENE GLYCOL 3350 17 G/17G
17 POWDER, FOR SOLUTION ORAL
Qty: 0 | Refills: 0 | DISCHARGE
Start: 2019-11-18

## 2019-11-18 RX ORDER — INFLUENZA VIRUS VACCINE 15; 15; 15; 15 UG/.5ML; UG/.5ML; UG/.5ML; UG/.5ML
0.5 SUSPENSION INTRAMUSCULAR
Qty: 0 | Refills: 0 | DISCHARGE

## 2019-11-18 RX ORDER — DOCUSATE SODIUM 100 MG
50 CAPSULE ORAL
Qty: 0 | Refills: 0 | DISCHARGE

## 2019-11-18 RX ADMIN — POLYETHYLENE GLYCOL 3350 17 GRAM(S): 17 POWDER, FOR SOLUTION ORAL at 07:44

## 2019-11-18 RX ADMIN — Medication 4: at 12:35

## 2019-11-18 RX ADMIN — PANTOPRAZOLE SODIUM 40 MILLIGRAM(S): 20 TABLET, DELAYED RELEASE ORAL at 12:41

## 2019-11-18 RX ADMIN — Medication 3: at 07:44

## 2019-11-18 NOTE — CHART NOTE - NSCHARTNOTEFT_GEN_A_CORE
CHIEF COMPLAINT:  patient condition remained stable awake nonverbal tolerating feeding at 210 cc every 4 hours  SUBJECTIVE:     REVIEW OF SYSTEMS:patient is aphasic    CONSTITUTIONAL: (  x)  weakness,  (  ) fevers or chills  EYES/ENT: (  )visual changes;     NECK: (  ) pain or stiffness  RESPIRATORY:   (  )cough, wheezing, hemoptysis;  (  ) shortness of breath  CARDIOVASCULAR:  (  )chest pain or palpitations  GASTROINTESTINAL:   (  )abdominal or epigastric pain.  (  ) nausea, vomiting, or hematemesis;   (   ) diarrhea or constipation.   GENITOURINARY:   (    ) dysuria, frequency or hematuria  NEUROLOGICAL:  (   ) numbness or weakness   All other review of systems is negative unless indicated above    Vital Signs Last 24 Hrs  T(C): 36.4 (18 Nov 2019 13:21), Max: 36.5 (17 Nov 2019 21:38)  T(F): 97.5 (18 Nov 2019 13:21), Max: 97.7 (17 Nov 2019 21:38)  HR: 95 (18 Nov 2019 13:21) (73 - 98)  BP: 126/78 (18 Nov 2019 13:21) (122/76 - 140/83)  BP(mean): --  RR: 18 (18 Nov 2019 13:21) (18 - 18)  SpO2: 96% (18 Nov 2019 13:21) (96% - 97%)    I&O's Summary      CAPILLARY BLOOD GLUCOSE      POCT Blood Glucose.: 342 mg/dL (18 Nov 2019 12:32)  POCT Blood Glucose.: 265 mg/dL (18 Nov 2019 07:08)  POCT Blood Glucose.: 249 mg/dL (17 Nov 2019 23:10)      PHYSICAL EXAM:    Constitutional:  (  x ) NAD,   (   )awake and alert  HEENT: PERR, EOMI,    Neck: Soft and supple, No LAD, No JVD  Respiratory:  (  x  Breath sounds are clear bilaterally,    (   ) wheezing, rales or rhonchi  Cardiovascular:     (  x )S1 and S2, regular rate and rhythm, no Murmurs, gallops or rubs  Gastrointestinal:  (  x )Bowel Sounds present, soft,   (  )nontender, nondistended,    Extremities:    (  ) peripheral edema  Vascular: 2+ peripheral pulses  Neurological:    (   x )A/O x 0   (  ) focal deficits  Musculoskeletal:    (   )  normal strength b/l upper  (     ) normal  lower extremities  Skin: No rashes    MEDICATIONS:  MEDICATIONS  (STANDING):  atorvastatin 20 milliGRAM(s) Oral at bedtime  dextrose 5%. 1000 milliLiter(s) (50 mL/Hr) IV Continuous <Continuous>  dextrose 50% Injectable 12.5 Gram(s) IV Push once  dextrose 50% Injectable 25 Gram(s) IV Push once  dextrose 50% Injectable 25 Gram(s) IV Push once  insulin lispro (HumaLOG) corrective regimen sliding scale   SubCutaneous every 6 hours  pantoprazole   Suspension 40 milliGRAM(s) Oral two times a day  polyethylene glycol 3350 17 Gram(s) Oral two times a day      LABS: All Labs Reviewed:                        13.4   5.87  )-----------( 282      ( 18 Nov 2019 07:00 )             41.5     11-18    140  |  102  |  26<H>  ----------------------------<  297<H>  4.4   |  25  |  0.76    Ca    9.0      18 Nov 2019 07:00            Blood Culture:   Urine Culture      RADIOLOGY/EKG:    ASSESSMENT AND PLAN:  patient condition is stable to discharge back to SNF with increased feeding 2-50 cc every 6 hours in the facility or will able to use the pump which is compatible with the nursing home device    DVT PPX:    ADVANCED DIRECTIVE:    DISPOSITION:

## 2019-11-18 NOTE — DISCHARGE NOTE NURSING/CASE MANAGEMENT/SOCIAL WORK - PATIENT PORTAL LINK FT
You can access the FollowMyHealth Patient Portal offered by [facility] by registering at the following website: [email or web address]. By joining [facility]’s FollowMyHealth portal, you will also be able to view your health information using other applications (apps) compatible with our system.

## 2020-03-27 ENCOUNTER — INPATIENT (INPATIENT)
Facility: HOSPITAL | Age: 75
LOS: 2 days | Discharge: OTHER INSTITUTION | End: 2020-03-30
Attending: STUDENT IN AN ORGANIZED HEALTH CARE EDUCATION/TRAINING PROGRAM | Admitting: STUDENT IN AN ORGANIZED HEALTH CARE EDUCATION/TRAINING PROGRAM
Payer: MEDICARE

## 2020-03-27 VITALS
TEMPERATURE: 98 F | SYSTOLIC BLOOD PRESSURE: 90 MMHG | OXYGEN SATURATION: 93 % | DIASTOLIC BLOOD PRESSURE: 60 MMHG | RESPIRATION RATE: 40 BRPM | HEART RATE: 125 BPM

## 2020-03-28 DIAGNOSIS — A41.9 SEPSIS, UNSPECIFIED ORGANISM: ICD-10-CM

## 2020-03-28 DIAGNOSIS — I95.9 HYPOTENSION, UNSPECIFIED: ICD-10-CM

## 2020-03-28 DIAGNOSIS — Z71.89 OTHER SPECIFIED COUNSELING: ICD-10-CM

## 2020-03-28 DIAGNOSIS — Z51.5 ENCOUNTER FOR PALLIATIVE CARE: ICD-10-CM

## 2020-03-28 DIAGNOSIS — E87.2 ACIDOSIS: ICD-10-CM

## 2020-03-28 DIAGNOSIS — R50.9 FEVER, UNSPECIFIED: ICD-10-CM

## 2020-03-28 DIAGNOSIS — N17.9 ACUTE KIDNEY FAILURE, UNSPECIFIED: ICD-10-CM

## 2020-03-28 LAB
ALBUMIN SERPL ELPH-MCNC: 3.2 G/DL — LOW (ref 3.3–5)
ALP SERPL-CCNC: 118 U/L — SIGNIFICANT CHANGE UP (ref 40–120)
ALT FLD-CCNC: 82 U/L — HIGH (ref 4–41)
ANION GAP SERPL CALC-SCNC: 17 MMO/L — HIGH (ref 7–14)
ANISOCYTOSIS BLD QL: SLIGHT — SIGNIFICANT CHANGE UP
APPEARANCE UR: SIGNIFICANT CHANGE UP
APTT BLD: 30.1 SEC — SIGNIFICANT CHANGE UP (ref 27.5–36.3)
AST SERPL-CCNC: 140 U/L — HIGH (ref 4–40)
BACTERIA # UR AUTO: HIGH
BASE EXCESS BLDV CALC-SCNC: 5.3 MMOL/L — SIGNIFICANT CHANGE UP
BASE EXCESS BLDV CALC-SCNC: 6.4 MMOL/L — SIGNIFICANT CHANGE UP
BASOPHILS # BLD AUTO: 0.07 K/UL — SIGNIFICANT CHANGE UP (ref 0–0.2)
BASOPHILS NFR BLD AUTO: 0.9 % — SIGNIFICANT CHANGE UP (ref 0–2)
BASOPHILS NFR SPEC: 0 % — SIGNIFICANT CHANGE UP (ref 0–2)
BILIRUB SERPL-MCNC: 0.3 MG/DL — SIGNIFICANT CHANGE UP (ref 0.2–1.2)
BILIRUB UR-MCNC: NEGATIVE — SIGNIFICANT CHANGE UP
BLOOD GAS VENOUS - CREATININE: 0.91 MG/DL — SIGNIFICANT CHANGE UP (ref 0.5–1.3)
BLOOD GAS VENOUS - CREATININE: 1.04 MG/DL — SIGNIFICANT CHANGE UP (ref 0.5–1.3)
BLOOD GAS VENOUS - FIO2: 21 — SIGNIFICANT CHANGE UP
BLOOD UR QL VISUAL: SIGNIFICANT CHANGE UP
BUN SERPL-MCNC: 51 MG/DL — HIGH (ref 7–23)
CALCIUM SERPL-MCNC: 9.7 MG/DL — SIGNIFICANT CHANGE UP (ref 8.4–10.5)
CHLORIDE BLDV-SCNC: 118 MMOL/L — HIGH (ref 96–108)
CHLORIDE BLDV-SCNC: 119 MMOL/L — HIGH (ref 96–108)
CHLORIDE SERPL-SCNC: 111 MMOL/L — HIGH (ref 98–107)
CO2 SERPL-SCNC: 25 MMOL/L — SIGNIFICANT CHANGE UP (ref 22–31)
COLOR SPEC: YELLOW — SIGNIFICANT CHANGE UP
CREAT SERPL-MCNC: 1.06 MG/DL — SIGNIFICANT CHANGE UP (ref 0.5–1.3)
EOSINOPHIL # BLD AUTO: 0.01 K/UL — SIGNIFICANT CHANGE UP (ref 0–0.5)
EOSINOPHIL NFR BLD AUTO: 0.1 % — SIGNIFICANT CHANGE UP (ref 0–6)
EOSINOPHIL NFR FLD: 0 % — SIGNIFICANT CHANGE UP (ref 0–6)
GAS PNL BLDV: 157 MMOL/L — HIGH (ref 136–146)
GAS PNL BLDV: 159 MMOL/L — HIGH (ref 136–146)
GLUCOSE BLDV-MCNC: 320 MG/DL — HIGH (ref 70–99)
GLUCOSE BLDV-MCNC: 351 MG/DL — HIGH (ref 70–99)
GLUCOSE SERPL-MCNC: 402 MG/DL — HIGH (ref 70–99)
GLUCOSE UR-MCNC: 200 — HIGH
HCO3 BLDV-SCNC: 26 MMOL/L — SIGNIFICANT CHANGE UP (ref 20–27)
HCO3 BLDV-SCNC: 27 MMOL/L — SIGNIFICANT CHANGE UP (ref 20–27)
HCT VFR BLD CALC: 47.1 % — SIGNIFICANT CHANGE UP (ref 39–50)
HCT VFR BLDV CALC: 42 % — SIGNIFICANT CHANGE UP (ref 39–51)
HCT VFR BLDV CALC: 48 % — SIGNIFICANT CHANGE UP (ref 39–51)
HGB BLD-MCNC: 14.6 G/DL — SIGNIFICANT CHANGE UP (ref 13–17)
HGB BLDV-MCNC: 13.7 G/DL — SIGNIFICANT CHANGE UP (ref 13–17)
HGB BLDV-MCNC: 15.7 G/DL — SIGNIFICANT CHANGE UP (ref 13–17)
HYALINE CASTS # UR AUTO: SIGNIFICANT CHANGE UP
IMM GRANULOCYTES NFR BLD AUTO: 0.6 % — SIGNIFICANT CHANGE UP (ref 0–1.5)
INR BLD: 1.31 — HIGH (ref 0.88–1.17)
KETONES UR-MCNC: SIGNIFICANT CHANGE UP
LACTATE BLDV-MCNC: 6.1 MMOL/L — CRITICAL HIGH (ref 0.5–2)
LACTATE BLDV-MCNC: 6.5 MMOL/L — CRITICAL HIGH (ref 0.5–2)
LEUKOCYTE ESTERASE UR-ACNC: SIGNIFICANT CHANGE UP
LYMPHOCYTES # BLD AUTO: 1.88 K/UL — SIGNIFICANT CHANGE UP (ref 1–3.3)
LYMPHOCYTES # BLD AUTO: 23.2 % — SIGNIFICANT CHANGE UP (ref 13–44)
LYMPHOCYTES NFR SPEC AUTO: 22 % — SIGNIFICANT CHANGE UP (ref 13–44)
MACROCYTES BLD QL: SLIGHT — SIGNIFICANT CHANGE UP
MANUAL SMEAR VERIFICATION: SIGNIFICANT CHANGE UP
MCHC RBC-ENTMCNC: 31 % — LOW (ref 32–36)
MCHC RBC-ENTMCNC: 31.5 PG — SIGNIFICANT CHANGE UP (ref 27–34)
MCV RBC AUTO: 101.5 FL — HIGH (ref 80–100)
MONOCYTES # BLD AUTO: 0.2 K/UL — SIGNIFICANT CHANGE UP (ref 0–0.9)
MONOCYTES NFR BLD AUTO: 2.5 % — SIGNIFICANT CHANGE UP (ref 2–14)
MONOCYTES NFR BLD: 2 % — SIGNIFICANT CHANGE UP (ref 2–9)
NEUTROPHIL AB SER-ACNC: 58 % — SIGNIFICANT CHANGE UP (ref 43–77)
NEUTROPHILS # BLD AUTO: 5.88 K/UL — SIGNIFICANT CHANGE UP (ref 1.8–7.4)
NEUTROPHILS NFR BLD AUTO: 72.7 % — SIGNIFICANT CHANGE UP (ref 43–77)
NEUTS BAND # BLD: 17 % — HIGH (ref 0–6)
NITRITE UR-MCNC: NEGATIVE — SIGNIFICANT CHANGE UP
NRBC # BLD: 1 /100WBC — SIGNIFICANT CHANGE UP
NRBC # FLD: 0.03 K/UL — SIGNIFICANT CHANGE UP (ref 0–0)
PCO2 BLDV: 60 MMHG — HIGH (ref 41–51)
PCO2 BLDV: 62 MMHG — HIGH (ref 41–51)
PH BLDV: 7.32 PH — SIGNIFICANT CHANGE UP (ref 7.32–7.43)
PH BLDV: 7.34 PH — SIGNIFICANT CHANGE UP (ref 7.32–7.43)
PH UR: 6 — SIGNIFICANT CHANGE UP (ref 5–8)
PLATELET # BLD AUTO: 207 K/UL — SIGNIFICANT CHANGE UP (ref 150–400)
PLATELET COUNT - ESTIMATE: NORMAL — SIGNIFICANT CHANGE UP
PMV BLD: 13.3 FL — HIGH (ref 7–13)
PO2 BLDV: 26 MMHG — LOW (ref 35–40)
PO2 BLDV: < 24 MMHG — LOW (ref 35–40)
POTASSIUM BLDV-SCNC: 3.7 MMOL/L — SIGNIFICANT CHANGE UP (ref 3.4–4.5)
POTASSIUM BLDV-SCNC: 4.2 MMOL/L — SIGNIFICANT CHANGE UP (ref 3.4–4.5)
POTASSIUM SERPL-MCNC: 4.2 MMOL/L — SIGNIFICANT CHANGE UP (ref 3.5–5.3)
POTASSIUM SERPL-SCNC: 4.2 MMOL/L — SIGNIFICANT CHANGE UP (ref 3.5–5.3)
PROT SERPL-MCNC: 7.8 G/DL — SIGNIFICANT CHANGE UP (ref 6–8.3)
PROT UR-MCNC: 70 — SIGNIFICANT CHANGE UP
PROTHROM AB SERPL-ACNC: 15.2 SEC — HIGH (ref 9.8–13.1)
RBC # BLD: 4.64 M/UL — SIGNIFICANT CHANGE UP (ref 4.2–5.8)
RBC # FLD: 15.7 % — HIGH (ref 10.3–14.5)
RBC CASTS # UR COMP ASSIST: SIGNIFICANT CHANGE UP (ref 0–?)
SAO2 % BLDV: 26.8 % — LOW (ref 60–85)
SAO2 % BLDV: 35.4 % — LOW (ref 60–85)
SODIUM SERPL-SCNC: 153 MMOL/L — HIGH (ref 135–145)
SP GR SPEC: 1.03 — SIGNIFICANT CHANGE UP (ref 1–1.04)
SQUAMOUS # UR AUTO: SIGNIFICANT CHANGE UP
UROBILINOGEN FLD QL: SIGNIFICANT CHANGE UP
VARIANT LYMPHS # BLD: 1 % — SIGNIFICANT CHANGE UP
WBC # BLD: 8.09 K/UL — SIGNIFICANT CHANGE UP (ref 3.8–10.5)
WBC # FLD AUTO: 8.09 K/UL — SIGNIFICANT CHANGE UP (ref 3.8–10.5)
WBC UR QL: >50 — HIGH (ref 0–?)

## 2020-03-28 PROCEDURE — 71045 X-RAY EXAM CHEST 1 VIEW: CPT | Mod: 26

## 2020-03-28 PROCEDURE — 99497 ADVNCD CARE PLAN 30 MIN: CPT | Mod: 25

## 2020-03-28 PROCEDURE — 99223 1ST HOSP IP/OBS HIGH 75: CPT

## 2020-03-28 RX ORDER — SODIUM CHLORIDE 9 MG/ML
500 INJECTION, SOLUTION INTRAVENOUS ONCE
Refills: 0 | Status: COMPLETED | OUTPATIENT
Start: 2020-03-28 | End: 2020-03-28

## 2020-03-28 RX ORDER — PIPERACILLIN AND TAZOBACTAM 4; .5 G/20ML; G/20ML
3.38 INJECTION, POWDER, LYOPHILIZED, FOR SOLUTION INTRAVENOUS EVERY 8 HOURS
Refills: 0 | Status: DISCONTINUED | OUTPATIENT
Start: 2020-03-28 | End: 2020-03-30

## 2020-03-28 RX ORDER — CEFTRIAXONE 500 MG/1
1000 INJECTION, POWDER, FOR SOLUTION INTRAMUSCULAR; INTRAVENOUS ONCE
Refills: 0 | Status: COMPLETED | OUTPATIENT
Start: 2020-03-28 | End: 2020-03-28

## 2020-03-28 RX ORDER — ACETAMINOPHEN 500 MG
650 TABLET ORAL ONCE
Refills: 0 | Status: COMPLETED | OUTPATIENT
Start: 2020-03-28 | End: 2020-03-28

## 2020-03-28 RX ORDER — MIDODRINE HYDROCHLORIDE 2.5 MG/1
10 TABLET ORAL ONCE
Refills: 0 | Status: COMPLETED | OUTPATIENT
Start: 2020-03-28 | End: 2020-03-28

## 2020-03-28 RX ORDER — AZITHROMYCIN 500 MG/1
500 TABLET, FILM COATED ORAL ONCE
Refills: 0 | Status: DISCONTINUED | OUTPATIENT
Start: 2020-03-28 | End: 2020-03-28

## 2020-03-28 RX ORDER — ACETAMINOPHEN 500 MG
650 TABLET ORAL EVERY 6 HOURS
Refills: 0 | Status: DISCONTINUED | OUTPATIENT
Start: 2020-03-28 | End: 2020-03-30

## 2020-03-28 RX ORDER — MIDODRINE HYDROCHLORIDE 2.5 MG/1
10 TABLET ORAL EVERY 8 HOURS
Refills: 0 | Status: DISCONTINUED | OUTPATIENT
Start: 2020-03-28 | End: 2020-03-28

## 2020-03-28 RX ORDER — AZITHROMYCIN 500 MG/1
500 TABLET, FILM COATED ORAL ONCE
Refills: 0 | Status: COMPLETED | OUTPATIENT
Start: 2020-03-28 | End: 2020-03-28

## 2020-03-28 RX ORDER — PIPERACILLIN AND TAZOBACTAM 4; .5 G/20ML; G/20ML
3.38 INJECTION, POWDER, LYOPHILIZED, FOR SOLUTION INTRAVENOUS ONCE
Refills: 0 | Status: COMPLETED | OUTPATIENT
Start: 2020-03-28 | End: 2020-03-28

## 2020-03-28 RX ORDER — MORPHINE SULFATE 50 MG/1
1 CAPSULE, EXTENDED RELEASE ORAL EVERY 4 HOURS
Refills: 0 | Status: DISCONTINUED | OUTPATIENT
Start: 2020-03-28 | End: 2020-03-30

## 2020-03-28 RX ADMIN — AZITHROMYCIN 500 MILLIGRAM(S): 500 TABLET, FILM COATED ORAL at 06:05

## 2020-03-28 RX ADMIN — Medication 650 MILLIGRAM(S): at 12:11

## 2020-03-28 RX ADMIN — MIDODRINE HYDROCHLORIDE 10 MILLIGRAM(S): 2.5 TABLET ORAL at 01:30

## 2020-03-28 RX ADMIN — PIPERACILLIN AND TAZOBACTAM 200 GRAM(S): 4; .5 INJECTION, POWDER, LYOPHILIZED, FOR SOLUTION INTRAVENOUS at 13:57

## 2020-03-28 RX ADMIN — Medication 650 MILLIGRAM(S): at 04:22

## 2020-03-28 RX ADMIN — SODIUM CHLORIDE 500 MILLILITER(S): 9 INJECTION, SOLUTION INTRAVENOUS at 04:22

## 2020-03-28 RX ADMIN — CEFTRIAXONE 100 MILLIGRAM(S): 500 INJECTION, POWDER, FOR SOLUTION INTRAMUSCULAR; INTRAVENOUS at 04:22

## 2020-03-28 RX ADMIN — Medication 650 MILLIGRAM(S): at 09:32

## 2020-03-28 RX ADMIN — PIPERACILLIN AND TAZOBACTAM 25 GRAM(S): 4; .5 INJECTION, POWDER, LYOPHILIZED, FOR SOLUTION INTRAVENOUS at 14:20

## 2020-03-28 RX ADMIN — PIPERACILLIN AND TAZOBACTAM 25 GRAM(S): 4; .5 INJECTION, POWDER, LYOPHILIZED, FOR SOLUTION INTRAVENOUS at 23:44

## 2020-03-28 NOTE — H&P ADULT - ASSESSMENT
74M, nonverbal, bedbound, w/SAH/CVA in NH, s/p PEG, DM, GERD, presenting from nursing home d/t fever/hypotension/decreased responsiveness, admitted for severe sepsis 2/2 UTI and possible COVID-19 infection, c/b JD and lactic acidosis.

## 2020-03-28 NOTE — H&P ADULT - PROBLEM SELECTOR PLAN 1
Discussed pts poor prognosis w/critical care attending, Dr. Hill, palliative attending, Dr. Dye and daughter, Marquise ([phone]). Daughter initially wanted pt FULL CODE due to [employer] background, however after hearing of his declining status, deferred decision regarding intubation and resuscitation to providers. Pt nonverbal, bedbound with multiple comorbidities, likely would not survive intubation.  Family understands and agrees with decision to make pt DNR/DNI and comfort  -Morphine 1g q4 for air hunger, resp distress   -Can start morphine gtt if pt requiring frequent pushes   -Once pt gets to floor, family interesting in facetime to say goodbye.

## 2020-03-28 NOTE — ED ADULT NURSE REASSESSMENT NOTE - NS ED NURSE REASSESS COMMENT FT1
Patient bedbound, A&O x 0. responds to stimulation by moving extremities.  Arrived from nursing home for unresponsiveness and fever.  PEG tube in placed, lower extremities contracted.  Skin intact.  Patient grinding teeth.  Appears comfortable and in no apparent distress.  Connected to cardiac monitor @ sinus rhythm, vitals taken and will continue to monitor patient.

## 2020-03-28 NOTE — H&P ADULT - NSHPLABSRESULTS_GEN_ALL_CORE
.  LABS:                         14.6   8.09  )-----------( 207      ( 28 Mar 2020 00:24 )             47.1         153<H>  |  111<H>  |  51<H>  ----------------------------<  402<H>  4.2   |  25  |  1.06    Ca    9.7      28 Mar 2020 00:24    TPro  7.8  /  Alb  3.2<L>  /  TBili  0.3  /  DBili  x   /  AST  140<H>  /  ALT  82<H>  /  AlkPhos  118      PT/INR - ( 28 Mar 2020 04:18 )   PT: 15.2 SEC;   INR: 1.31          PTT - ( 28 Mar 2020 04:18 )  PTT:30.1 SEC  Urinalysis Basic - ( 28 Mar 2020 00:24 )    Color: YELLOW / Appearance: Lt TURBID / S.035 / pH: 6.0  Gluc: 200 / Ketone: TRACE  / Bili: NEGATIVE / Urobili: TRACE   Blood: TRACE / Protein: 70 / Nitrite: NEGATIVE   Leuk Esterase: LARGE / RBC: 0-2 / WBC >50   Sq Epi: OCC / Non Sq Epi: x / Bacteria: MANY                RADIOLOGY, EKG & ADDITIONAL TESTS: Reviewed.   EKG    Xray Chest 1 View AP/PA (20 @ 01:41)   IMPRESSION: Patchy, peripheral streaky opacities on the left side may represent Covid19 infection. (Moderate index of suspicion)    SHELLY MARTINEZ M.D., RADIOLOGY RESIDENT  This document has been electronically signed.  GABE GRANDE M.D., ATTENDING RADIOLOGIST  This document has been electronically signed. Mar 28 2020  8:10AM

## 2020-03-28 NOTE — H&P ADULT - HISTORY OF PRESENT ILLNESS
Per ED, 73 yo non-verbal M, AAO x 0 at baseline, bed bound/does not walk, w/ PMH of SAH/CVA in nursing home x 2 years, dysphagia s/p PEG, DM, GERD, presenting from nursing home d/t fever/hypotension/decreased responsiveness, found during routine vital sign check. Patient unable to provide any history. Spoke with daughter, Marquise, by phone, who indicates that family has not seen patient x 2 weeks d/t new visitor policy in light of COVID pandemic. State that nursing home only told them he had fever and hypotension this evening, prompting call to EMS. Daughter confirms that patient is full code    Vital Signs Last 24 Hrs  T(C): 38.1 (28 Mar 2020 09:21), Max: 38.9 (28 Mar 2020 08:42)  T(F): 100.6 (28 Mar 2020 09:21), Max: 102 (28 Mar 2020 08:42)  HR: 118 (28 Mar 2020 09:21) (112 - 125)  BP: 104/68 (28 Mar 2020 09:21) (90/60 - 115/73)  BP(mean): --  RR: 40 (28 Mar 2020 09:21) (28 - 40)  SpO2: 97% (28 Mar 2020 04:23) (93% - 97%)    Patient received Ceftriaxone, Azithromycin, Midodrine, Tylenol, and 500cc LR bolus in ED.

## 2020-03-28 NOTE — ED PROVIDER NOTE - CLINICAL SUMMARY MEDICAL DECISION MAKING FREE TEXT BOX
75 yo non-verbal M, AAO x 0 at baseline, bed bound/does not walk, w/ PMH of SAH/CVA in nursing home x 2 years, dysphagia s/p PEG, DM, GERD, presenting from nursing home d/t fever/hypotension/decreased responsiveness, found during routine vital sign check. Patient unable to provide any history. Family unable to provide further history as have not seen patient x 2 weeks. Patient arrives tachycardic, hypotensive, w/ lungs showing diffuse rhonchi, concerning for COVID infection. Will provide gentle fluids given hypotension in setting of possible COVID infection. Will obtain septic workup, including rvp, covid testing, cxr, blood cultures. Will obtain ekg, place on telemetry monitor and puls ox. Will place empirically on abx for possible bacterial pna, and reassess. Will require hospital admission.

## 2020-03-28 NOTE — ED PROVIDER NOTE - CARDIAC, MLM
Tachycardic rate, regular rhythm.  Heart sounds S1, S2.  No murmurs, rubs or gallops. 2+ radial pulses, equal BL.

## 2020-03-28 NOTE — CONSULT NOTE ADULT - ASSESSMENT
75 yo non-verbal M, AAO x 0 at baseline, bed bound/does not walk, w/ PMH of SAH/CVA in nursing home x 2 years, dysphagia s/p PEG, DM, GERD, presenting from nursing home d/t fever/hypotension/decreased responsiveness, found during routine vital sign check. Pt r/o covid 19    Hypernatremia   Pt unable to access free water  Can give free water via PEG 300cc Q6 hours  Check x-ray patchy. Would avoid excessive IV fluid if possible, optimize free water via PEG  UA with UTI. Check urine culture  continue Abx in D5W    Acidosis  in the setting of lactic acidosis  ? UTI  r/o covid  Monitor at present     Hypotension   on midodrine per team   Avoid hypotension

## 2020-03-28 NOTE — ED PROVIDER NOTE - OBJECTIVE STATEMENT
73 yo non-verbal M, AAO x 0 at baseline, does not walk, w/ PMH DM, GERD, history of CVA in nursing home x 2 hours     FULL CODE  Daughter (Marquise): [phone] 75 yo non-verbal M, AAO x 0 at baseline, bed bound/does not walk, w/ PMH of SAH/CVA in nursing home x 2 years, dysphagia s/p PEG, DM, GERD, presenting from nursing home d/t fever/hypotension/decreased responsiveness, found during routine vital sign check. Patient unable to provide any history. Spoke with daughter, Marquise, by phone, who indicates that family has not seen patient x 2 weeks d/t new visitor policy in light of COVID pandemic. State that nursing home only told them he had fever and hypotension this evening, prompting call to EMS. Daughter confirms that patient is full code.     FULL CODE  PMD:  Dr. Porter  Daughter (Marquise): [phone]

## 2020-03-28 NOTE — H&P ADULT - NSHPPHYSICALEXAM_GEN_ALL_CORE
.  VITAL SIGNS:  T(C): 38.1 (03-28-20 @ 09:21), Max: 38.9 (03-28-20 @ 08:42)  T(F): 100.6 (03-28-20 @ 09:21), Max: 102 (03-28-20 @ 08:42)  HR: 118 (03-28-20 @ 09:21) (112 - 125)  BP: 104/68 (03-28-20 @ 09:21) (90/60 - 115/73)  BP(mean): --  RR: 40 (03-28-20 @ 09:21) (28 - 40)  SpO2: 97% (03-28-20 @ 04:23) (93% - 97%)  Wt(kg): --    PHYSICAL EXAM:  Per ED,   · CONSTITUTIONAL: Thin appearing, decreased alertness but responsive to pain, oriented x 0.  · ENMT: Airway patent, Nasal mucosa clear. Mouth with normal mucosa.  · EYES: Clear bilaterally, pupils equal, round  · CARDIAC: Tachycardic rate, regular rhythm.  Heart sounds S1, S2.  No murmurs, rubs or gallops. 2+ radial pulses, equal BL.  · RESPIRATORY: Breath sounds with diffuse rhonchi BL. +Tachypnea.  · GASTROINTESTINAL: Abdomen soft, non-tender, no guarding.  · MUSCULOSKELETAL: Spine appears normal, range of motion is not limited, no muscle or joint tenderness  · NEUROLOGICAL: AAO x 0, moving all extremities, responds to pain.  · SKIN: Skin normal color for race, warm, dry and intact. No lower extremity edema.  · PSYCHIATRIC: Alert and oriented to person, place, time/situation. normal mood and affect.

## 2020-03-28 NOTE — CONSULT NOTE ADULT - SUBJECTIVE AND OBJECTIVE BOX
Olu Anthony MD  Interventional Cardiology / Advance Heart Failure and Cardiac Transplant Specialist  [place] Office : [phone] [place] [address] [place]. [address]  Tel:   [place] Office : [address] [facility] [place] [address]  Tel: [phone]  Cell : [phone]      HISTORY OF PRESENTING ILLNESS:  HPI:  Per ED, 73 yo non-verbal M, AAO x 0 at baseline, bed bound/does not walk, w/ PMH of SAH/CVA in nursing home x 2 years, dysphagia s/p PEG, DM, GERD, presenting from nursing home d/t fever/hypotension/decreased responsiveness, found during routine vital sign check. Patient unable to provide any history. Spoke with daughter, Marquise, by phone, who indicates that family has not seen patient x 2 weeks d/t new visitor policy in light of COVID pandemic. State that nursing home only told them he had fever and hypotension this evening, prompting call to EMS. Daughter confirms that patient is full code  Patient received Ceftriaxone, Azithromycin, Midodrine, Tylenol, and 500cc LR bolus in ED.     PAST MEDICAL & SURGICAL HISTORY:  Arthritis  Hemorrhoids, unspecified hemorrhoid type  SAH (subarachnoid hemorrhage)  BPH (benign prostatic hypertrophy)  Gastritis  Dyslipidemia  HTN (Hypertension), Benign  Diabetes  CAD (Coronary Artery Disease)  Stented coronary artery  Appendicitis      SOCIAL HISTORY: Substance Use (street drugs): ( x ) never used  (  ) other:    FAMILY HISTORY:  Family history of liver disease: Mother  from liver disease 42 yo  Cerebrovascular accident (CVA): Father 82 yo      MEDICATIONS:    piperacillin/tazobactam IVPB.. 3.375 Gram(s) IV Intermittent every 8 hours      acetaminophen   Tablet .. 650 milliGRAM(s) Oral every 6 hours PRN  morphine  - Injectable 1 milliGRAM(s) IV Push every 4 hours PRN            FAMILY HISTORY:  Family history of liver disease: Mother  from liver disease 42 yo  Cerebrovascular accident (CVA): Father 82 yo        Allergies    No Known Allergies    Intolerances    	      PHYSICAL EXAM:  T(C): 38.1 (20 @ 09:21), Max: 38.9 (20 @ 08:42)  HR: 118 (20 @ 09:21) (112 - 125)  BP: 104/68 (20 @ 09:21) (90/60 - 115/73)  RR: 40 (20 @ 09:21) (28 - 40)  SpO2: 97% (20 @ 04:23) (93% - 97%)  Wt(kg): --  I&O's Summary        EYES: EOMI, PERRLA, conjunctiva and sclera clear  ENMT: No tonsillar erythema, exudates, or enlargement; Moist mucous membranes, Good dentition, No lesions  Cardiovascular: Normal S1 S2, No JVD, No murmurs, No edema  Respiratory: Lungs clear to auscultation	  Gastrointestinal:  s/p peg  Extremities: no edema      LABS:	 	    CARDIAC MARKERS:                                  14.6   8.09  )-----------( 207      ( 28 Mar 2020 00:24 )             47.1         153<H>  |  111<H>  |  51<H>  ----------------------------<  402<H>  4.2   |  25  |  1.06    Ca    9.7      28 Mar 2020 00:24    TPro  7.8  /  Alb  3.2<L>  /  TBili  0.3  /  DBili  x   /  AST  140<H>  /  ALT  82<H>  /  AlkPhos  118      proBNP:   Lipid Profile:   HgA1c:   TSH:     Consultant(s) Notes Reviewed:  [x ] YES  [ ] NO    Care Discussed with Consultants/Other Providers [ x] YES  [ ] NO    Imaging Personally Reviewed independently:  [x] YES  [ ] NO    All labs, radiologic studies, vitals, orders and medications list reviewed. Patient is seen and examined at bedside. Case discussed with medical team.    ASSESSMENT/PLAN:

## 2020-03-28 NOTE — ED ADULT NURSE REASSESSMENT NOTE - NS ED NURSE REASSESS COMMENT FT1
pt now dnr dni as per paper work,  pt cleaned ad turned and positioned iv patent in rt hand. meds given as ordered/ pts peg tube patent/  no decubiti noted pt contracted in fetal position  report to floor

## 2020-03-28 NOTE — ED ADULT NURSE NOTE - OBJECTIVE STATEMENT
Break Coverage RN: Patient sent from NH for fever, patient tachypneic on exam, O2 applied. Patient cleaned and repositioned. IV placed RT arm #20g , LT arm #22g. Labs sent, will ctm. Break Coverage RN: Patient sent from NH for fever, patient tachypneic on exam, O2 applied.  Patient has g-tube in place, no bleeding/redness/swelling around site. Patient cleaned and repositioned. IV placed RT arm #20g , LT arm #22g. Labs sent, will ctm.

## 2020-03-28 NOTE — CONSULT NOTE ADULT - ASSESSMENT
EKG not in chart     Echo < from: Transthoracic Echocardiogram (05.10.17 @ 14:35) >  1. Calcified trileaflet aortic valve with normal opening.  Minimal aortic regurgitation.  2. Normal left ventricular systolic function. No segmental  wall motion abnormalities.  3. Normal right ventricular sizeand function.    < end of copied text >      1)  Fever/Hypotension - dehydrated would increase fluids via PEG 300ml q4, UTI , lactic acidosis on zosyn, pt DNR /DNI    2) h/o SAH -  s/p PEG    3) h/o CAD - no ASA sec to h/o SAH and GI bleed

## 2020-03-28 NOTE — ED ADULT NURSE REASSESSMENT NOTE - NS ED NURSE REASSESS COMMENT FT1
pt in contracted position, iv in rt hand  rectal temp taken 102,  pt on cardiac monitor  st 115, , pt non verbal at this time/ pt remains on isolation for covid

## 2020-03-28 NOTE — ED PROVIDER NOTE - ATTENDING CONTRIBUTION TO CARE
MD Thorpe:  I performed a face to face bedside interview with patient regarding history of present illness, review of symptoms and past medical history. I completed an independent physical exam(documented below).  I have discussed patient's plan of care with resident.   I agree with note as stated above, having amended the EMR as needed to reflect my findings. I have discussed the assessment and plan of care.  This includes during the time I functioned as the attending physician for this patient.  PE:  Gen: somnolent  Head: NC, AT,  EOMI, normal lids/conjunctiva  ENT:  normal hearing, patent oropharynx without erythema/exudate  Neck: +supple, no tenderness/meningismus/JVD, +Trachea midline  Chest: no chest wall tenderness, equal chest rise  Pulm: Bilateral BS, normal resp effort, crackles in bases  CV: tachy, no M/R/G, +dist pulses  Abd: +BS, soft, NT/ND  Rectal: deferred  Mskel: no edema/erythema/cyanosis  Skin: no rash  Neuro: somnolent, non-verbal, moves extremities spontaneously, localizes to pain  MDM:  75yo M, non-verbal at baseline and PEG dependent, w/ pmh of DM, SAH/CVA, sent from nursing home for fever, hypotension, and decreased responsiveness. Maintaining airway at this time, tachycardic and soft BPs on arrival, improved w/ fluids. Family called to establish code status and per daughters - pt is to remain full code status. Labs (including covid19 testing), imaging, meds, admission.

## 2020-03-28 NOTE — ED ADULT NURSE REASSESSMENT NOTE - NS ED NURSE REASSESS COMMENT FT1
Pt received from day RN. Pt laying comfortably in stretcher. Vitals as noted. Awaiting transport to bed upstairs. Will continue to monitor.

## 2020-03-28 NOTE — ED ADULT NURSE REASSESSMENT NOTE - NS ED NURSE REASSESS COMMENT FT1
pt evaluated by hospitalist, pt to talk with daughter about pts condition. pt remains on isolation and fetal positions/  feeding tube patent

## 2020-03-28 NOTE — CONSULT NOTE ADULT - SUBJECTIVE AND OBJECTIVE BOX
[facility]   MD Osbaldo Lazo MD Fatima Sheikh, D.O. Ruoru Wong, PA    From 7 AM - 5 PM:  OFFICE: [phone]  Dr. Le cell: [phone]  Dr. Montiel Cell: [phone]  Dr. Zaldivar cell: [phone]  LIAM Tucker cell: [phone]    From 5 PM - 7 AM: Answering Service: [phone]      -- INITIAL RENAL CONSULT NOTE  --------------------------------------------------------------------------------  HPI: information obtained via chart review. Pt not seen as pt is r/o covid 19.  75 yo non-verbal M, AAO x 0 at baseline, bed bound/does not walk, w/ PMH of SAH/CVA in nursing home x 2 years, dysphagia s/p PEG, DM, GERD, presenting from nursing home d/t fever/hypotension/decreased responsiveness, found during routine vital sign check. Patient unable to provide any history. Spoke with daughter, Marquise, by phone, who indicates that family has not seen patient x 2 weeks d/t new visitor policy in light of COVID pandemic. State that nursing home only told them he had fever and hypotension this evening, prompting call to EMS. Daughter confirms that patient is full code    PAST HISTORY  --------------------------------------------------------------------------------  PAST MEDICAL & SURGICAL HISTORY:  Arthritis  Hemorrhoids, unspecified hemorrhoid type  SAH (subarachnoid hemorrhage)  BPH (benign prostatic hypertrophy)  Gastritis  Dyslipidemia  HTN (Hypertension), Benign  Diabetes  CAD (Coronary Artery Disease)  Stented coronary artery  Appendicitis    FAMILY HISTORY:  Family history of liver disease: Mother  from liver disease 42 yo  Cerebrovascular accident (CVA): Father 82 yo    PAST SOCIAL HISTORY:  Dementia     ALLERGIES & MEDICATIONS  --------------------------------------------------------------------------------  Allergies    No Known Allergies    Intolerances      Standing Inpatient Medications  midodrine. 10 milliGRAM(s) Oral every 8 hours  piperacillin/tazobactam IVPB. 3.375 Gram(s) IV Intermittent once  piperacillin/tazobactam IVPB.. 3.375 Gram(s) IV Intermittent every 8 hours    PRN Inpatient Medications  acetaminophen   Tablet .. 650 milliGRAM(s) Oral every 6 hours PRN      REVIEW OF SYSTEMS  --------------------------------------------------------------------------------  per H&P. Pt non-verbal. Dementia     VITALS/PHYSICAL EXAM  --------------------------------------------------------------------------------  T(C): 38.1 (20 @ 09:21), Max: 38.9 (20 @ 08:42)  HR: 118 (20 @ 09:21) (112 - 125)  BP: 104/68 (20 @ 09:21) (90/60 - 115/73)  RR: 40 (20 @ 09:21) (28 - 40)  SpO2: 97% (20 @ 04:23) (93% - 97%)  Wt(kg): --    Pt not seen due to r/o Covid   Physical Exam Per ER attending:  · CONSTITUTIONAL: Thin appearing, decreased alertness but responsive to pain, oriented x 0.  · ENMT: Airway patent, Nasal mucosa clear. Mouth with normal mucosa.  · EYES: Clear bilaterally, pupils equal, round  · CARDIAC: Tachycardic rate, regular rhythm.  Heart sounds S1, S2.  No murmurs, rubs or gallops. 2+ radial pulses, equal BL.  · RESPIRATORY: Breath sounds with diffuse rhonchi BL. +Tachypnea.  · GASTROINTESTINAL: Abdomen soft, non-tender, no guarding.  · MUSCULOSKELETAL: Spine appears normal, range of motion is not limited, no muscle or joint tenderness  · NEUROLOGICAL: AAO x 0, moving all extremities, responds to pain.  · SKIN: Skin normal color for race, warm, dry and intact. No lower extremity edema.  · PSYCHIATRIC: Alert and oriented to person, place, time/situation. normal mood and affect.      LABS/STUDIES  --------------------------------------------------------------------------------              14.6   8.09  >-----------<  207      [20 @ 00:24]              47.1     153  |  111  |  51  ----------------------------<  402      [20 @ 00:24]  4.2   |  25  |  1.06        Ca     9.7     [20 @ 00:24]    TPro  7.8  /  Alb  3.2  /  TBili  0.3  /  DBili  x   /  AST  140  /  ALT  82  /  AlkPhos  118  [20 @ 00:24]    PT/INR: PT 15.2 , INR 1.31       [20 @ 04:18]  PTT: 30.1       [20 @ 04:18]      Creatinine Trend:  SCr 1.06 [ 00:24]    Urinalysis - [20 @ 00:24]      Color YELLOW / Appearance Lt TURBID / SG 1.035 / pH 6.0      Gluc 200 / Ketone TRACE  / Bili NEGATIVE / Urobili TRACE       Blood TRACE / Protein 70 / Leuk Est LARGE / Nitrite NEGATIVE      RBC 0-2 / WBC >50 / Hyaline 1+ / Gran  / Sq Epi OCC / Non Sq Epi  / Bacteria MANY      HbA1c 6.6      [18 @ 06:00]    HCV 0.15, Nonreactive Hepatitis C AB  S/CO Ratio                        Interpretation  < 1.00                                   Non-Reactive  1.00 - 4.99                         Weakly-Reactive  >= 5.00                                Reactive  Non-Reactive: Aperson with a non-reactive HCV antibody  result is considered uninfected.  No further action is  needed unless recent infection is suspected.  In these  cases, consider repeat testing later to detect  seroconversion..  Weakly-Reactive: HCV antibody test is abnormal, HCV RNA  Qualitative test will follow.  Reactive: HCV antibody test is abnormal, HCV RNA  Qualitative test will follow.  Note: HCV antibody testing is performed on the [employer]   system.      [19 @ 06:30]

## 2020-03-28 NOTE — ED PROVIDER NOTE - PROGRESS NOTE DETAILS
Jorje, PGY3: Spoke with patient's PMD, Dr. Bang, who indicates cannot admit patient under his service, as he is currently on self-quarantine for COVID, and requests patient to be admitted under hospitalist service.

## 2020-03-29 LAB
-  COAGULASE NEGATIVE STAPHYLOCOCCUS: SIGNIFICANT CHANGE UP
CRP SERPL-MCNC: 251 MG/L — HIGH
CULTURE RESULTS: SIGNIFICANT CHANGE UP
ERYTHROCYTE [SEDIMENTATION RATE] IN BLOOD: 78 MM/HR — HIGH (ref 1–15)
GRAM STN FLD: SIGNIFICANT CHANGE UP
LACTATE SERPL-SCNC: 3 MMOL/L — HIGH (ref 0.5–2)
LACTATE SERPL-SCNC: 5.3 MMOL/L — CRITICAL HIGH (ref 0.5–2)
METHOD TYPE: SIGNIFICANT CHANGE UP
ORGANISM # SPEC MICROSCOPIC CNT: SIGNIFICANT CHANGE UP
ORGANISM # SPEC MICROSCOPIC CNT: SIGNIFICANT CHANGE UP
SARS-COV-2 RNA SPEC QL NAA+PROBE: SIGNIFICANT CHANGE UP
SPECIMEN SOURCE: SIGNIFICANT CHANGE UP
SPECIMEN SOURCE: SIGNIFICANT CHANGE UP
TROPONIN T, HIGH SENSITIVITY: 68 NG/L — CRITICAL HIGH (ref ?–14)
TROPONIN T, HIGH SENSITIVITY: 69 NG/L — CRITICAL HIGH (ref ?–14)

## 2020-03-29 RX ORDER — VANCOMYCIN HCL 1 G
1000 VIAL (EA) INTRAVENOUS ONCE
Refills: 0 | Status: COMPLETED | OUTPATIENT
Start: 2020-03-29 | End: 2020-03-29

## 2020-03-29 RX ADMIN — PIPERACILLIN AND TAZOBACTAM 25 GRAM(S): 4; .5 INJECTION, POWDER, LYOPHILIZED, FOR SOLUTION INTRAVENOUS at 09:55

## 2020-03-29 RX ADMIN — Medication 250 MILLIGRAM(S): at 05:26

## 2020-03-29 RX ADMIN — PIPERACILLIN AND TAZOBACTAM 25 GRAM(S): 4; .5 INJECTION, POWDER, LYOPHILIZED, FOR SOLUTION INTRAVENOUS at 17:27

## 2020-03-29 NOTE — DIETITIAN INITIAL EVALUATION ADULT. - PERTINENT LABORATORY DATA
03-28 Na 153 mmol/L<H> Glu 402 mg/dL<H> K+ 4.2 mmol/L Cr 1.06 mg/dL BUN 51 mg/dL<H> Phos n/a   Alb 3.2 g/dL<L> PAB n/a   Hgb 14.6 g/dL Hct 47.1 % HgA1C n/a

## 2020-03-29 NOTE — PROGRESS NOTE ADULT - PROBLEM SELECTOR PLAN 1
Discussed pts poor prognosis w/critical care attending, Dr. Hill, palliative attending, Dr. Dye and daughter, Marquise ([phone]). Daughter initially wanted pt FULL CODE due to Muslim background, however after hearing of his declining status, deferred decision regarding intubation and resuscitation to providers. Pt nonverbal, bedbound with multiple comorbidities, likely would not survive intubation.  Family understands and agrees with decision to make pt DNR/DNI and comfort  -Morphine 1g q4 for air hunger, resp distress   -morphine gtt if needed for SOB

## 2020-03-29 NOTE — PROVIDER CONTACT NOTE (CRITICAL VALUE NOTIFICATION) - NAME OF MD/NP/PA/DO NOTIFIED:
June 26, 2017              Murray Chavez is currently being cared for at [facility].  Patient needs to be on bedrest for the remainder of her pregnancy due to complications with blood pressure and headaches.        Thank you,          Barbra Ulloa M.D.        
Sayed Shaun WHEELER
SayLIAM French

## 2020-03-29 NOTE — PROGRESS NOTE ADULT - ASSESSMENT
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 Pt r/o covid 19    Hypernatremia   Pt unable to access free water  No new labs for review today   Can give free water via PEG 300cc Q6 hours  Check x-ray patchy. Would avoid excessive IV fluid if possible, optimize free water via PEG  UA with ecoli UTI and bacteremia. Pt on IV abx   continue Abx in D5W    Acidosis  in the setting of lactic acidosis  + UTI with bacteremia   r/o covid negative   Monitor at present     Hypotension   on midodrine per team   Avoid hypotension

## 2020-03-29 NOTE — PROGRESS NOTE ADULT - SUBJECTIVE AND OBJECTIVE BOX
[facility]   MD Osbaldo Lazo MD, D.O.  LIAM Gonzalez    From 7 AM - 5 PM:  OFFICE: [phone]  Dr. Le cell: [phone]  Dr. Montiel cell: [phone]  Dr. Zaldivar cell: [phone]  LIAM Tucker cell: [phone]    From 5 PM - 7 AM: Answering Service: [phone]      RENAL FOLLOW UP NOTE  --------------------------------------------------------------------------------  HPI: Pt is a covid r/o     PAST HISTORY  --------------------------------------------------------------------------------  No significant changes to PMH, PSH, FHx, SHx, unless otherwise noted    ALLERGIES & MEDICATIONS  --------------------------------------------------------------------------------  Allergies    No Known Allergies    Intolerances      Standing Inpatient Medications  piperacillin/tazobactam IVPB.. 3.375 Gram(s) IV Intermittent every 8 hours    PRN Inpatient Medications  acetaminophen   Tablet .. 650 milliGRAM(s) Oral every 6 hours PRN  morphine  - Injectable 1 milliGRAM(s) IV Push every 4 hours PRN      REVIEW OF SYSTEMS  --------------------------------------------------------------------------------  Unable to obtain     VITALS/PHYSICAL EXAM  --------------------------------------------------------------------------------  T(C): 36.3 (03-29-20 @ 09:13), Max: 38 (03-28-20 @ 18:34)  HR: 105 (03-29-20 @ 09:13) (104 - 108)  BP: 112/65 (03-29-20 @ 09:13) (110/65 - 126/67)  RR: 24 (03-29-20 @ 09:13) (24 - 35)  SpO2: 100% (03-29-20 @ 09:13) (96% - 100%)  Wt(kg): --      Pt not seen due to r/o covid. Note done by chart review.   Physical Exam per internal medicine physician    LABS/STUDIES  --------------------------------------------------------------------------------              14.6   8.09  >-----------<  207      [03-28-20 @ 00:24]              47.1     153  |  111  |  51  ----------------------------<  402      [03-28-20 @ 00:24]  4.2   |  25  |  1.06        Ca     9.7     [03-28-20 @ 00:24]    TPro  7.8  /  Alb  3.2  /  TBili  0.3  /  DBili  x   /  AST  140  /  ALT  82  /  AlkPhos  118  [03-28-20 @ 00:24]    PT/INR: PT 15.2 , INR 1.31       [03-28-20 @ 04:18]  PTT: 30.1       [03-28-20 @ 04:18]      Creatinine Trend:  SCr 1.06 [03-28 @ 00:24]    Urinalysis - [03-28-20 @ 00:24]      Color YELLOW / Appearance Lt TURBID / SG 1.035 / pH 6.0      Gluc 200 / Ketone TRACE  / Bili NEGATIVE / Urobili TRACE       Blood TRACE / Protein 70 / Leuk Est LARGE / Nitrite NEGATIVE      RBC 0-2 / WBC >50 / Hyaline 1+ / Gran  / Sq Epi OCC / Non Sq Epi  / Bacteria MANY      HbA1c 6.6      [07-27-18 @ 06:00]

## 2020-03-29 NOTE — PROGRESS NOTE ADULT - SUBJECTIVE AND OBJECTIVE BOX
Patient is a 74y old  Male who presents with a chief complaint of unresponsiveness (29 Mar 2020 16:53)      SUBJECTIVE / OVERNIGHT EVENTS:    Events noted.  CONSTITUTIONAL: No fever,  or fatigue  RESPIRATORY: No cough, wheezing,  No shortness of breath  CARDIOVASCULAR: No chest pain, palpitations, dizziness, or leg swelling  GASTROINTESTINAL: No abdominal or epigastric pain. No nausea, vomiting.  NEUROLOGICAL: No headaches,     MEDICATIONS  (STANDING):  piperacillin/tazobactam IVPB.. 3.375 Gram(s) IV Intermittent every 8 hours    MEDICATIONS  (PRN):  acetaminophen   Tablet .. 650 milliGRAM(s) Oral every 6 hours PRN Temp greater or equal to 38C (100.4F), Moderate Pain (4 - 6)  morphine  - Injectable 1 milliGRAM(s) IV Push every 4 hours PRN RR>20, air hunger, resp distress        CAPILLARY BLOOD GLUCOSE        I&O's Summary      PHYSICAL EXAM:  GENERAL: NAD  NECK: Supple, No JVD  CHEST/LUNG: Clear to auscultation bilaterally; No wheezing.  HEART: Regular rate and rhythm; No murmurs, rubs, or gallops  ABDOMEN: Soft, Nontender, Nondistended; Bowel sounds present  EXTREMITIES:   No edema  NEUROLOGY: AAO X 3      LABS:                        14.6   8.09  )-----------( 207      ( 28 Mar 2020 00:24 )             47.1     -    153<H>  |  111<H>  |  51<H>  ----------------------------<  402<H>  4.2   |  25  |  1.06    Ca    9.7      28 Mar 2020 00:24    TPro  7.8  /  Alb  3.2<L>  /  TBili  0.3  /  DBili  x   /  AST  140<H>  /  ALT  82<H>  /  AlkPhos  118  03-28    PT/INR - ( 28 Mar 2020 04:18 )   PT: 15.2 SEC;   INR: 1.31          PTT - ( 28 Mar 2020 04:18 )  PTT:30.1 SEC      Urinalysis Basic - ( 28 Mar 2020 00:24 )    Color: YELLOW / Appearance: Lt TURBID / S.035 / pH: 6.0  Gluc: 200 / Ketone: TRACE  / Bili: NEGATIVE / Urobili: TRACE   Blood: TRACE / Protein: 70 / Nitrite: NEGATIVE   Leuk Esterase: LARGE / RBC: 0-2 / WBC >50   Sq Epi: OCC / Non Sq Epi: x / Bacteria: MANY      CAPILLARY BLOOD GLUCOSE         @ 12:37  Culture-urine --  Culture results   >100,000 CFU/ml Gram Negative Rods  method type --  Organism --  Organism Identification --  Specimen source .Urine Clean Catch (Midstream)   @ 06:46  Culture-urine --  Culture results   No growth to date.  method type PCR  Organism Blood Culture PCR  Organism Identification Blood Culture PCR  Specimen source .Blood Blood-Peripheral            @ 12:37  Culture blood --  Culture results   >100,000 CFU/ml Gram Negative Rods  Gram stain --  Gram stain blood --  Method type --  Organism --  Organism identification --  Specimen source .Urine Clean Catch (Midstream)    @ 06:46  Culture blood --  Culture results   No growth to date.  Gram stain   Growth in aerobic bottle: Gram Positive Cocci in Clusters  Gram stain blood --  Method type PCR  Organism Blood Culture PCR  Organism identification Blood Culture PCR  Specimen source .Blood Blood-Peripheral      RADIOLOGY & ADDITIONAL TESTS:    Imaging Personally Reviewed:    Consultant(s) Notes Reviewed:      Care Discussed with Consultants/Other Providers: Patient is a 74y old  Male who presents with a chief complaint of unresponsiveness (29 Mar 2020 16:53)      SUBJECTIVE / OVERNIGHT EVENTS:    Events noted.  CONSTITUTIONAL:  Nonverbal  No N/V      MEDICATIONS  (STANDING):  piperacillin/tazobactam IVPB.. 3.375 Gram(s) IV Intermittent every 8 hours    MEDICATIONS  (PRN):  acetaminophen   Tablet .. 650 milliGRAM(s) Oral every 6 hours PRN Temp greater or equal to 38C (100.4F), Moderate Pain (4 - 6)  morphine  - Injectable 1 milliGRAM(s) IV Push every 4 hours PRN RR>20, air hunger, resp distress        CAPILLARY BLOOD GLUCOSE        I&O's Summary      PHYSICAL EXAM:    NECK: Supple, No JVD  CHEST/LUNG: Clear to auscultation bilaterally; No wheezing.  HEART: Regular rate and rhythm; No murmurs, rubs, or gallops  ABDOMEN: Soft, Nontender, Nondistended; Bowel sounds present  EXTREMITIES:   No edema  NEUROLOGY: nonverbal      LABS:                        14.6   8.09  )-----------( 207      ( 28 Mar 2020 00:24 )             47.1         153<H>  |  111<H>  |  51<H>  ----------------------------<  402<H>  4.2   |  25  |  1.06    Ca    9.7      28 Mar 2020 00:24    TPro  7.8  /  Alb  3.2<L>  /  TBili  0.3  /  DBili  x   /  AST  140<H>  /  ALT  82<H>  /  AlkPhos  118      PT/INR - ( 28 Mar 2020 04:18 )   PT: 15.2 SEC;   INR: 1.31          PTT - ( 28 Mar 2020 04:18 )  PTT:30.1 SEC      Urinalysis Basic - ( 28 Mar 2020 00:24 )    Color: YELLOW / Appearance: Lt TURBID / S.035 / pH: 6.0  Gluc: 200 / Ketone: TRACE  / Bili: NEGATIVE / Urobili: TRACE   Blood: TRACE / Protein: 70 / Nitrite: NEGATIVE   Leuk Esterase: LARGE / RBC: 0-2 / WBC >50   Sq Epi: OCC / Non Sq Epi: x / Bacteria: MANY      CAPILLARY BLOOD GLUCOSE         @ 12:37  Culture-urine --  Culture results   >100,000 CFU/ml Gram Negative Rods  method type --  Organism --  Organism Identification --  Specimen source .Urine Clean Catch (Midstream)   @ 06:46  Culture-urine --  Culture results   No growth to date.  method type PCR  Organism Blood Culture PCR  Organism Identification Blood Culture PCR  Specimen source .Blood Blood-Peripheral            @ 12:37  Culture blood --  Culture results   >100,000 CFU/ml Gram Negative Rods  Gram stain --  Gram stain blood --  Method type --  Organism --  Organism identification --  Specimen source .Urine Clean Catch (Midstream)    @ 06:46  Culture blood --  Culture results   No growth to date.  Gram stain   Growth in aerobic bottle: Gram Positive Cocci in Clusters  Gram stain blood --  Method type PCR  Organism Blood Culture PCR  Organism identification Blood Culture PCR  Specimen source .Blood Blood-Peripheral      RADIOLOGY & ADDITIONAL TESTS:    Imaging Personally Reviewed:    Consultant(s) Notes Reviewed:      Care Discussed with Consultants/Other Providers:

## 2020-03-29 NOTE — DIETITIAN INITIAL EVALUATION ADULT. - ENERGY NEEDS
Height (cm): 160.02 (26 Jul 2018, from previous admit)  Weight (kg): 49.5 (28 Mar 2020)  BMI (kg/m2): 19.3  IBW: 56.3kg +/-10%

## 2020-03-29 NOTE — DIETITIAN INITIAL EVALUATION ADULT. - OTHER INFO
Per chart review patient AAO x 0 at baseline, bed bound, with medical history of SAH/CVA, dysphagia s/p PEG, DM, GERD, presenting from NH with fever, hypotension, decreased responsiveness. Unable to obtain subjective information RE: diet/weight history. Information obtained largely from extensive chart review. Per EMR patient with PEG for nutrition. Unable to obtain TF regimen at NH. No reported weight change in H&P. Patient with past admission to [facility] and RD note reviewed - patient with weight of 49.5kg (11/13/19). Unable to obtained UBW. Current admit weight 49.5kg (3/28, bed-scale). No GI distress (nausea/vomiting/diarrhea/constipation) noted at this time. Per EMR, patient on full comfort measures. Remains NPO without TFs at this time.

## 2020-03-30 ENCOUNTER — TRANSCRIPTION ENCOUNTER (OUTPATIENT)
Age: 75
End: 2020-03-30

## 2020-03-30 VITALS — WEIGHT: 109.79 LBS

## 2020-03-30 RX ORDER — INSULIN DETEMIR 100/ML (3)
10 INSULIN PEN (ML) SUBCUTANEOUS
Qty: 0 | Refills: 0 | DISCHARGE

## 2020-03-30 RX ORDER — ACETAMINOPHEN 500 MG
2 TABLET ORAL
Qty: 0 | Refills: 0 | DISCHARGE

## 2020-03-30 RX ORDER — METFORMIN HYDROCHLORIDE 850 MG/1
1 TABLET ORAL
Qty: 0 | Refills: 0 | DISCHARGE

## 2020-03-30 RX ORDER — MORPHINE SULFATE 50 MG/1
1 CAPSULE, EXTENDED RELEASE ORAL
Qty: 0 | Refills: 0 | DISCHARGE
Start: 2020-03-30

## 2020-03-30 RX ORDER — HUMAN INSULIN 100 [IU]/ML
20 INJECTION, SUSPENSION SUBCUTANEOUS
Qty: 0 | Refills: 0 | DISCHARGE

## 2020-03-30 RX ADMIN — PIPERACILLIN AND TAZOBACTAM 25 GRAM(S): 4; .5 INJECTION, POWDER, LYOPHILIZED, FOR SOLUTION INTRAVENOUS at 00:55

## 2020-03-30 RX ADMIN — PIPERACILLIN AND TAZOBACTAM 25 GRAM(S): 4; .5 INJECTION, POWDER, LYOPHILIZED, FOR SOLUTION INTRAVENOUS at 07:55

## 2020-03-30 NOTE — DISCHARGE NOTE PROVIDER - PROVIDER TOKENS
FREE:[LAST:[Hospice],PHONE:[(   )    -],FAX:[(   )    -],ADDRESS:[Family to follow-up with hospice facility]]

## 2020-03-30 NOTE — DISCHARGE NOTE PROVIDER - CARE PROVIDER_API CALL
Hospice,   Family to follow-up with hospice facility  Phone: (   )    -  Fax: (   )    -  Follow Up Time:

## 2020-03-30 NOTE — DISCHARGE NOTE NURSING/CASE MANAGEMENT/SOCIAL WORK - PATIENT PORTAL LINK FT
You can access the FollowMyHealth Patient Portal offered by [facility] by registering at the following website: [email or web address]. By joining [facility]’s FollowMyHealth portal, you will also be able to view your health information using other applications (apps) compatible with our system.

## 2020-03-30 NOTE — DISCHARGE NOTE PROVIDER - HOSPITAL COURSE
74M, nonverbal, bedbound, w/SAH/CVA in NH, s/p PEG, DM, GERD, presenting from nursing home d/t fever/hypotension/decreased responsiveness, admitted for severe sepsis 2/2 UTI c/b JD and lactic acidosis. COVID-19 test was negative. During admission, pts family agreed to make pt DNR. Plan is for pt to go to hospice upon discharge. 74M, nonverbal, bedbound, w/SAH/CVA in NH, s/p PEG, DM, GERD, presenting from nursing home d/t fever/hypotension/decreased responsiveness, admitted for severe sepsis 2/2 UTI c/b JD and lactic acidosis. COVID-19 test was negative. During admission, pts family agreed to make pt DNR. Plan is for pt to go to hospice  with morphine PRN for symptom management upon discharge.

## 2020-03-30 NOTE — DISCHARGE NOTE PROVIDER - NSDCCPCAREPLAN_GEN_ALL_CORE_FT
PRINCIPAL DISCHARGE DIAGNOSIS  Diagnosis: Sepsis  Assessment and Plan of Treatment: Pt DNR/DNI. Comfort measures only      SECONDARY DISCHARGE DIAGNOSES  Diagnosis: Hypotension  Assessment and Plan of Treatment:

## 2020-03-30 NOTE — GOALS OF CARE CONVERSATION - ADVANCED CARE PLANNING - CONVERSATION DETAILS
[facility] - Consents signed. Pt has been approved for an InPt bed and a bed is available at [facility]. Hospice is ready to render care.

## 2020-03-30 NOTE — DISCHARGE NOTE NURSING/CASE MANAGEMENT/SOCIAL WORK - NSDCPNINST_GEN_ALL_CORE
Pt on comfort care. Change patient often as he is incontinent urine and stool. Maintain oxygen level. Pt currently on 3L nasal cannula. Pt. non verbal.

## 2020-03-30 NOTE — PROGRESS NOTE ADULT - ASSESSMENT
Echo < from: Transthoracic Echocardiogram (05.10.17 @ 14:35) >  1. Calcified trileaflet aortic valve with normal opening.  Minimal aortic regurgitation.  2. Normal left ventricular systolic function. No segmental  wall motion abnormalities.  3. Normal right ventricular sizeand function.    < end of copied text >      1)  Fever/Hypotension - dehydrated  cont fluids via PEG  , UTI , lactic acidosis on zosyn, pt DNR /DNI, COVID negative    2) h/o SAH -  s/p PEG    3) h/o CAD - no ASA sec to h/o SAH and GI bleed

## 2020-03-30 NOTE — PROGRESS NOTE ADULT - ASSESSMENT
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 Pt r/o covid 19    Hypernatremia   Pt unable to access free water  No new labs for review today   Can give free water via PEG 300cc Q6 hours  Check x-ray patchy. Would avoid excessive IV fluid if possible, optimize free water via PEG  UA with ecoli UTI and bacteremia. Pt on IV abx   continue Abx in D5W    pt is pending inpatient hospice. comfort care. nephrology would sign off at this time. please call if any questions    Acidosis  in the setting of lactic acidosis  + UTI with bacteremia   r/o covid negative   Monitor at present     Hypotension   per team   acceptable  monitor

## 2020-03-30 NOTE — PROGRESS NOTE ADULT - SUBJECTIVE AND OBJECTIVE BOX
[facility] [place] [facility]   MD FARHAT LEWIS DO ANAM SIDDIQUI ANGELA WONG, PA    TEL:  OFFICE: [phone]  DR HICKMAN CELL: [phone]  DR. LAMBERT CELL: [phone]  DR. LONG CELL: [phone]  VERA CASTILLO CELL: [phone]        Patient is a 74y old  Male who presents with a chief complaint of unresponsiveness (30 Mar 2020 11:35)      Patient not seen, pt in isolation pending r/o for covid     VITALS:  T(F): 97.7 (03-29-20 @ 21:52), Max: 99.2 (03-29-20 @ 17:29)  HR: 103 (03-30-20 @ 06:59)  BP: 109/60 (03-30-20 @ 06:59)  RR: 20 (03-30-20 @ 06:59)  SpO2: 99% (03-30-20 @ 06:59)  Wt(kg): --      Weight (kg): 49.8 (03-30 @ 10:15)    PHYSICAL EXAM:  pt not seen or examined, in isolation pending covid     Hospital Medications:   MEDICATIONS  (STANDING):  piperacillin/tazobactam IVPB.. 3.375 Gram(s) IV Intermittent every 8 hours      LABS:        Creatinine Trend: 1.06 <--            Urine Studies:  Urinalysis - [03-28-20 @ 00:24]      Color YELLOW / Appearance Lt TURBID / SG 1.035 / pH 6.0      Gluc 200 / Ketone TRACE  / Bili NEGATIVE / Urobili TRACE       Blood TRACE / Protein 70 / Leuk Est LARGE / Nitrite NEGATIVE      RBC 0-2 / WBC >50 / Hyaline 1+ / Gran  / Sq Epi OCC / Non Sq Epi  / Bacteria MANY      HbA1c 6.6      [07-27-18 @ 06:00]        RADIOLOGY & ADDITIONAL STUDIES:

## 2020-03-30 NOTE — PROGRESS NOTE ADULT - SUBJECTIVE AND OBJECTIVE BOX
Olu Anthony MD  Interventional Cardiology / Advance Heart Failure and Cardiac Transplant Specialist  [place] Office : [address] [place] [address] [place]. [address]  Tel:   [place] Office : [address] [facility] [place] [address]  Tel: [phone]  Cell : [phone]    Pt is lying in bed   not in distress   	  MEDICATIONS:    piperacillin/tazobactam IVPB.. 3.375 Gram(s) IV Intermittent every 8 hours      acetaminophen   Tablet .. 650 milliGRAM(s) Oral every 6 hours PRN  morphine  - Injectable 1 milliGRAM(s) IV Push every 4 hours PRN            PAST MEDICAL/SURGICAL HISTORY  PAST MEDICAL & SURGICAL HISTORY:  Arthritis  Hemorrhoids, unspecified hemorrhoid type  SAH (subarachnoid hemorrhage)  BPH (benign prostatic hypertrophy)  Gastritis  Dyslipidemia  HTN (Hypertension), Benign  Diabetes  CAD (Coronary Artery Disease)  Stented coronary artery  Appendicitis      SOCIAL HISTORY: Substance Use (street drugs): ( x ) never used  (  ) other:    FAMILY HISTORY:  Family history of liver disease: Mother  from liver disease 42 yo  Cerebrovascular accident (CVA): Father 80 yo         PHYSICAL EXAM:  T(C): 36.5 (20 @ 21:52), Max: 37.3 (20 @ 17:29)  HR: 103 (20 @ 06:59) (103 - 109)  BP: 109/60 (20 @ 06:59) (103/71 - 109/68)  RR: 20 (20 @ 06:59) (20 - 24)  SpO2: 99% (20 @ 06:59) (98% - 99%)  Wt(kg): --  I&O's Summary      Weight (kg): 49.8 ( @ 10:15)       EYES: EOMI, PERRLA, conjunctiva and sclera clear  ENMT: No tonsillar erythema, exudates, or enlargement; Moist mucous membranes, Good dentition, No lesions  Cardiovascular: Normal S1 S2, No JVD, No murmurs, No edema  Respiratory: b/l rhonchi  Gastrointestinal:  s/p PEG  Extremities: no edema                      proBNP:   Lipid Profile:   HgA1c:   TSH:     Consultant(s) Notes Reviewed:  [x ] YES  [ ] NO    Care Discussed with Consultants/Other Providers [ x] YES  [ ] NO    Imaging Personally Reviewed independently:  [x] YES  [ ] NO    All labs, radiologic studies, vitals, orders and medications list reviewed. Patient is seen and examined at bedside. Case discussed with medical team.

## 2020-04-01 LAB
CULTURE RESULTS: SIGNIFICANT CHANGE UP
SPECIMEN SOURCE: SIGNIFICANT CHANGE UP

## 2020-04-30 NOTE — CHART NOTE - NSCHARTNOTEFT_GEN_A_CORE
I, Avani Haq MD attest that, to the best of my knowledge based on the clinical presentation and findings documented in the medical record, this patient had functional quadriplegia with total dependence on a caregiver for all activities of daily living.

## 2020-09-17 NOTE — PATIENT PROFILE ADULT - NSPROHMGUSYMPCOND_GEN_A_NUR
----- Message from Mayra Le MD sent at 9/16/2020  4:58 PM EDT -----  Please review abnormal intrinsic factor with the patient- he is to follow with GI team, referral is provided incontinence

## 2021-10-04 NOTE — ED ADULT TRIAGE NOTE - HEART RATE (BEATS/MIN)
Spoke to patient, but had to get off phone as I was speaking to him- he will give me a call back. [phone]

## 2022-09-16 NOTE — PATIENT PROFILE ADULT. - TEACHING/LEARNING CULTURAL CONSIDERATIONS
GENERAL: + fever no chills  EYES: No change in vision  HEENT: + trouble swallowing due to throat pain  CARDIAC: No chest pain  PULMONARY: No cough or SOB  GI: No abdominal pain, + vomiting x1, no diarrhea or constipation  : No changes in urination  SKIN: No rashes  NEURO: No headache, no numbness  MSK: No joint pain  Otherwise as HPI or negative. none

## 2023-02-14 NOTE — PROGRESS NOTE ADULT - PROBLEM SELECTOR PLAN 7
Home with home care - ASA not listed on facility medrec, unclear if taken off due to SAH?  - See above Sub-Acute rehab

## 2023-06-07 NOTE — PROGRESS NOTE ADULT - PROVIDER SPECIALTY LIST ADULT
Cardiology Otezla Pregnancy And Lactation Text: This medication is Pregnancy Category C and it isn't known if it is safe during pregnancy. It is unknown if it is excreted in breast milk.

## 2024-11-13 NOTE — PROGRESS NOTE ADULT - PROVIDER SPECIALTY LIST ADULT
Chronic a flutter, on Toprol-XL and Coumadin  [id number] of 7, needs bridging  Resume home Toprol-XL 25 mg daily  Resume therapeutic Lovenox twice daily with Coumadin concomitantly  Outpatient INR checks with family doctor   Internal Medicine